# Patient Record
Sex: MALE | Race: WHITE | Employment: FULL TIME | URBAN - METROPOLITAN AREA
[De-identification: names, ages, dates, MRNs, and addresses within clinical notes are randomized per-mention and may not be internally consistent; named-entity substitution may affect disease eponyms.]

---

## 2017-02-16 DIAGNOSIS — Z94.0 KIDNEY TRANSPLANTED: ICD-10-CM

## 2017-02-16 RX ORDER — PREDNISONE 5 MG/1
5 TABLET ORAL DAILY
Qty: 30 TABLET | Refills: 11 | Status: SHIPPED | OUTPATIENT
Start: 2017-02-16 | End: 2018-08-02

## 2017-02-16 RX ORDER — AZATHIOPRINE 50 MG/1
100 TABLET ORAL DAILY
Qty: 60 TABLET | Refills: 11 | Status: SHIPPED | OUTPATIENT
Start: 2017-02-16 | End: 2018-06-15

## 2017-02-27 ENCOUNTER — OFFICE VISIT (OUTPATIENT)
Dept: NEPHROLOGY | Facility: CLINIC | Age: 67
End: 2017-02-27
Attending: INTERNAL MEDICINE
Payer: MEDICARE

## 2017-02-27 VITALS
HEART RATE: 66 BPM | HEIGHT: 65 IN | TEMPERATURE: 97.9 F | RESPIRATION RATE: 16 BRPM | SYSTOLIC BLOOD PRESSURE: 129 MMHG | WEIGHT: 170.4 LBS | BODY MASS INDEX: 28.39 KG/M2 | DIASTOLIC BLOOD PRESSURE: 77 MMHG

## 2017-02-27 DIAGNOSIS — Z94.0 KIDNEY REPLACED BY TRANSPLANT: Primary | ICD-10-CM

## 2017-02-27 DIAGNOSIS — D84.9 IMMUNOSUPPRESSION (H): ICD-10-CM

## 2017-02-27 DIAGNOSIS — Z94.0 KIDNEY TRANSPLANT RECIPIENT: Primary | ICD-10-CM

## 2017-02-27 DIAGNOSIS — Z79.60 LONG-TERM USE OF IMMUNOSUPPRESSANT MEDICATION: ICD-10-CM

## 2017-02-27 DIAGNOSIS — Z94.0 KIDNEY TRANSPLANT RECIPIENT: ICD-10-CM

## 2017-02-27 DIAGNOSIS — E55.9 VITAMIN D DEFICIENCY: ICD-10-CM

## 2017-02-27 DIAGNOSIS — E78.2 MIXED HYPERLIPIDEMIA: ICD-10-CM

## 2017-02-27 PROBLEM — Z48.298 AFTERCARE FOLLOWING ORGAN TRANSPLANT: Status: ACTIVE | Noted: 2017-02-27

## 2017-02-27 LAB
ALBUMIN SERPL-MCNC: 3.9 G/DL (ref 3.4–5)
ALP SERPL-CCNC: 72 U/L (ref 40–150)
ALT SERPL W P-5'-P-CCNC: 23 U/L (ref 0–70)
ANION GAP SERPL CALCULATED.3IONS-SCNC: 8 MMOL/L (ref 3–14)
AST SERPL W P-5'-P-CCNC: 11 U/L (ref 0–45)
BILIRUB DIRECT SERPL-MCNC: 0.1 MG/DL (ref 0–0.2)
BILIRUB SERPL-MCNC: 0.6 MG/DL (ref 0.2–1.3)
BUN SERPL-MCNC: 24 MG/DL (ref 7–30)
CALCIUM SERPL-MCNC: 8.6 MG/DL (ref 8.5–10.1)
CHLORIDE SERPL-SCNC: 107 MMOL/L (ref 94–109)
CO2 SERPL-SCNC: 26 MMOL/L (ref 20–32)
CREAT SERPL-MCNC: 0.79 MG/DL (ref 0.66–1.25)
CREAT UR-MCNC: 59 MG/DL
ERYTHROCYTE [DISTWIDTH] IN BLOOD BY AUTOMATED COUNT: 11.9 % (ref 10–15)
GFR SERPL CREATININE-BSD FRML MDRD: ABNORMAL ML/MIN/1.7M2
GLUCOSE SERPL-MCNC: 124 MG/DL (ref 70–99)
HCT VFR BLD AUTO: 39.8 % (ref 40–53)
HGB BLD-MCNC: 13.1 G/DL (ref 13.3–17.7)
MCH RBC QN AUTO: 32.4 PG (ref 26.5–33)
MCHC RBC AUTO-ENTMCNC: 32.9 G/DL (ref 31.5–36.5)
MCV RBC AUTO: 99 FL (ref 78–100)
PLATELET # BLD AUTO: 232 10E9/L (ref 150–450)
POTASSIUM SERPL-SCNC: 5 MMOL/L (ref 3.4–5.3)
PROT SERPL-MCNC: 7.3 G/DL (ref 6.8–8.8)
PROT UR-MCNC: 0.25 G/L
PROT/CREAT 24H UR: 0.43 G/G CR (ref 0–0.2)
RBC # BLD AUTO: 4.04 10E12/L (ref 4.4–5.9)
SODIUM SERPL-SCNC: 141 MMOL/L (ref 133–144)
WBC # BLD AUTO: 5.7 10E9/L (ref 4–11)

## 2017-02-27 PROCEDURE — 80048 BASIC METABOLIC PNL TOTAL CA: CPT | Performed by: INTERNAL MEDICINE

## 2017-02-27 PROCEDURE — 99213 OFFICE O/P EST LOW 20 MIN: CPT | Mod: ZF

## 2017-02-27 PROCEDURE — 36415 COLL VENOUS BLD VENIPUNCTURE: CPT | Performed by: INTERNAL MEDICINE

## 2017-02-27 PROCEDURE — 80076 HEPATIC FUNCTION PANEL: CPT | Performed by: INTERNAL MEDICINE

## 2017-02-27 PROCEDURE — 84156 ASSAY OF PROTEIN URINE: CPT | Performed by: INTERNAL MEDICINE

## 2017-02-27 PROCEDURE — 85027 COMPLETE CBC AUTOMATED: CPT | Performed by: INTERNAL MEDICINE

## 2017-02-27 ASSESSMENT — PAIN SCALES - GENERAL: PAINLEVEL: NO PAIN (0)

## 2017-02-27 NOTE — PROGRESS NOTES
ASSESSMENT AND PLAN:   1.  End-stage kidney disease, status post living donor kidney transplant 31 years ago with excellent stable allograft function.   2.  Immunosuppression management.  Currently maintained on dual agent with Imuran 100 mg daily and prednisone 5 mg.   3.  Dyslipidemia, well controlled on statin therapy.   4.  Hypertension, controlled.   5.  Renal osteodystrophy.  We will need to check a PTH and vitamin D at some point.   6.  Opportunistic infection prophylaxis.  Currently on Bactrim lifelong with good tolerance.      DISCUSSION:  Overall, Mr. Yfn Cavazos appears to be doing fairly well.  He had no specific complaints today, and his allograft function is steady.  In regard to his immunosuppression, he is tolerating his regimen fairly well without obvious complication.  He follows up with Dermatology regularly, and he is scheduled to see them tomorrow.  I made no changes to his immunosuppression.  We reviewed the labs and discussed routine followup.  With his next lab draw, we will ask the coordinator to add vitamin D and PTH checks, and he will return to clinic in 1 year.      HISTORY OF PRESENT ILLNESS:  Mr. Yfn Cavazos is a 66-year-old gentleman with end-stage kidney disease.  He was on dialysis for a year and half prior to receiving a living donor kidney transplant on 08/06/1985.  Post-engraftment, he had no obvious complications.  He was tapered off CNI about 5-7 years ago slowly, and he tolerated this taper fairly well.  He does not have significant proteinuria.  He is currently maintained on Imuran and prednisone 5 mg daily.  He comes in for routine followup.  He has no specific complaints or concerns.  He follows up regularly with his primary care provider.  He was recently checked for hepatitis C in response to a TV ad, and was negative.      REVIEW OF SYSTEMS:  Comprehensive review of systems was negative, except as noted above.            Transplant Hx:       Tx: LDKT  Date: 8/6/1985       " Present Maintenance IS: Azathioprine and Prednisone       Baseline Creatinine: 0.8-0.9 mg/dL       Recent DSA: No         Biopsy: No    Home BP: well controlled.      ROS:   A comprehensive review of systems was obtained and negative, except as noted in the HPI or PMH.    Active Medical Problems:  Patient Active Problem List   Diagnosis     Kidney replaced by transplant       Personal Hx:  Social History     Social History     Marital status:      Spouse name: N/A     Number of children: N/A     Years of education: N/A     Occupational History     Not on file.     Social History Main Topics     Smoking status: Never Smoker     Smokeless tobacco: Never Used     Alcohol use Not on file     Drug use: Not on file     Sexual activity: Not on file     Other Topics Concern     Not on file     Social History Narrative       Allergies:  Allergies   Allergen Reactions     Contrast Dye Rash       Medications:  Prior to Admission medications    Medication Sig Start Date End Date Taking? Authorizing Provider   predniSONE (DELTASONE) 5 MG tablet Take 1 tablet (5 mg) by mouth daily 2/16/17  Yes Scout Baumann MD   azaTHIOprine (IMURAN) 50 MG tablet Take 2 tablets (100 mg) by mouth daily 2/16/17  Yes Scout Baumann MD   sulfamethoxazole-trimethoprim (BACTRIM/SEPTRA) 400-80 MG per tablet Take 1 tablet by mouth daily 12/22/16  Yes Johana Retana MD   pravastatin (PRAVACHOL) 40 MG tablet Take 1 tablet (40 mg) by mouth daily 11/19/13  Yes Johana Retana MD       Vitals:  /77  Pulse 66  Temp 97.9  F (36.6  C) (Oral)  Resp 16  Ht 1.657 m (5' 5.25\")  Wt 77.3 kg (170 lb 6.4 oz)  BMI 28.14 kg/m2    Exam:   GENERAL APPEARANCE: alert and no distress  HENT: mouth without ulcers or lesions  LYMPHATICS: no cervical or supraclavicular nodes  RESP: lungs clear to auscultation - no rales, rhonchi or wheezes  CV: regular rhythm, normal rate, no rub, no murmur  EDEMA: no LE edema bilaterally  ABDOMEN: soft, nondistended, " nontender, bowel sounds normal  MS: extremities normal - no gross deformities noted, no evidence of inflammation in joints, no muscle tenderness  SKIN: no rash    Results:   Reviewed

## 2017-02-27 NOTE — LETTER
Date:March 3, 2017      Patient was self referred, no letter generated. Do not send.        [facility] Health Information

## 2017-02-27 NOTE — NURSING NOTE
"Chief Complaint   Patient presents with     RECHECK     Kidney follow up       Initial /77  Pulse 66  Temp 97.9  F (36.6  C) (Oral)  Resp 16  Ht 1.657 m (5' 5.25\")  Wt 77.3 kg (170 lb 6.4 oz)  BMI 28.14 kg/m2 Estimated body mass index is 28.14 kg/(m^2) as calculated from the following:    Height as of this encounter: 1.657 m (5' 5.25\").    Weight as of this encounter: 77.3 kg (170 lb 6.4 oz).  Medication Reconciliation: complete    "

## 2017-02-27 NOTE — MR AVS SNAPSHOT
"              After Visit Summary   2/27/2017    Yfn Cavazos    MRN: [id number]           Patient Information     Date Of Birth          1950        Visit Information        Provider Department      2/27/2017 2:00 PM Scout Baumann MD [facility] Nephrology        Today's Diagnoses     Kidney replaced by transplant    -  1    Immunosuppression (H)        Mixed hyperlipidemia           Follow-ups after your visit        Who to contact     If you have questions or need follow up information about today's clinic visit or your schedule please contact [facility] NEPHROLOGY directly at [phone].  Normal or non-critical lab and imaging results will be communicated to you by Scand[employer]art, letter or phone within 4 business days after the clinic has received the results. If you do not hear from us within 7 days, please contact the clinic through [employer] or phone. If you have a critical or abnormal lab result, we will notify you by phone as soon as possible.  Submit refill requests through [employer] or call your pharmacy and they will forward the refill request to us. Please allow 3 business days for your refill to be completed.          Additional Information About Your Visit        MyChart Information     [employer] gives you secure access to your electronic health record. If you see a primary care provider, you can also send messages to your care team and make appointments. If you have questions, please call your primary care clinic.  If you do not have a primary care provider, please call [phone] and they will assist you.        Care EveryWhere ID     This is your Care EveryWhere ID. This could be used by other organizations to access your [place] medical records  [id number]        Your Vitals Were     Pulse Temperature Respirations Height BMI (Body Mass Index)       66 97.9  F (36.6  C) (Oral) 16 1.657 m (5' 5.25\") 28.14 kg/m2        Blood Pressure from Last 3 Encounters:   02/27/17 129/77   04/27/15 120/73 "   04/21/14 116/66    Weight from Last 3 Encounters:   02/27/17 77.3 kg (170 lb 6.4 oz)   04/27/15 77.3 kg (170 lb 6.4 oz)   04/21/14 77.5 kg (170 lb 14.4 oz)              Today, you had the following     No orders found for display       Primary Care Provider    Clinic Do Not Use [facility]       DU[place]  [place] [address]        Thank you!     Thank you for choosing [facility] NEPHROLOGY  for your care. Our goal is always to provide you with excellent care. Hearing back from our patients is one way we can continue to improve our services. Please take a few minutes to complete the written survey that you may receive in the mail after your visit with us. Thank you!             Your Updated Medication List - Protect others around you: Learn how to safely use, store and throw away your medicines at www.disposemymeds.org.          This list is accurate as of: 2/27/17 11:59 PM.  Always use your most recent med list.                   Brand Name Dispense Instructions for use    azaTHIOprine 50 MG tablet    IMURAN    60 tablet    Take 2 tablets (100 mg) by mouth daily       pravastatin 40 MG tablet    PRAVACHOL    90 tablet    Take 1 tablet (40 mg) by mouth daily       predniSONE 5 MG tablet    DELTASONE    30 tablet    Take 1 tablet (5 mg) by mouth daily       sulfamethoxazole-trimethoprim 400-80 MG per tablet    BACTRIM/SEPTRA    30 tablet    Take 1 tablet by mouth daily

## 2017-02-27 NOTE — NURSING NOTE
Received message from Dr. Baumann: pth and vitamin d         Once a year needs to be checked   Please add to next labs     Lab orders entered.    Marisol Bryan RN

## 2017-02-27 NOTE — Clinical Note
2/27/2017       RE: Yfn Cavazos  [id number] [address]     Dear Colleague,    Thank you for referring your patient, Yfn Cavazos, to the [facility] NEPHROLOGY at [facility]. Please see a copy of my visit note below.    ASSESSMENT AND PLAN:   1.  End-stage kidney disease, status post living donor kidney transplant 31 years ago with excellent stable allograft function.   2.  Immunosuppression management.  Currently maintained on dual agent with Imuran 100 mg daily and prednisone 5 mg.   3.  Dyslipidemia, well controlled on statin therapy.   4.  Hypertension, controlled.   5.  Renal osteodystrophy.  We will need to check a PTH and vitamin D at some point.   6.  Opportunistic infection prophylaxis.  Currently on Bactrim lifelong with good tolerance.      DISCUSSION:  Overall, Mr. Yfn Cavazos appears to be doing fairly well.  He had no specific complaints today, and his allograft function is steady.  In regard to his immunosuppression, he is tolerating his regimen fairly well without obvious complication.  He follows up with Dermatology regularly, and he is scheduled to see them tomorrow.  I made no changes to his immunosuppression.  We reviewed the labs and discussed routine followup.  With his next lab draw, we will ask the coordinator to add vitamin D and PTH checks, and he will return to clinic in 1 year.      HISTORY OF PRESENT ILLNESS:  Mr. Yfn Cavazos is a 66-year-old gentleman with end-stage kidney disease.  He was on dialysis for a year and half prior to receiving a living donor kidney transplant on 08/06/1985.  Post-engraftment, he had no obvious complications.  He was tapered off CNI about 5-7 years ago slowly, and he tolerated this taper fairly well.  He does not have significant proteinuria.  He is currently maintained on Imuran and prednisone 5 mg daily.  He comes in for routine followup.  He has no specific complaints or concerns.  He follows up  "regularly with his primary care provider.  He was recently checked for hepatitis C in response to a TV ad, and was negative.      REVIEW OF SYSTEMS:  Comprehensive review of systems was negative, except as noted above.            Transplant Hx:       Tx: LDKT  Date: 8/6/1985       Present Maintenance IS: Azathioprine and Prednisone       Baseline Creatinine: 0.8-0.9 mg/dL       Recent DSA: No         Biopsy: No    Home BP: well controlled.      ROS:   A comprehensive review of systems was obtained and negative, except as noted in the HPI or PMH.    Active Medical Problems:  Patient Active Problem List   Diagnosis     Kidney replaced by transplant       Personal Hx:  Social History     Social History     Marital status:      Spouse name: N/A     Number of children: N/A     Years of education: N/A     Occupational History     Not on file.     Social History Main Topics     Smoking status: Never Smoker     Smokeless tobacco: Never Used     Alcohol use Not on file     Drug use: Not on file     Sexual activity: Not on file     Other Topics Concern     Not on file     Social History Narrative       Allergies:  Allergies   Allergen Reactions     Contrast Dye Rash       Medications:  Prior to Admission medications    Medication Sig Start Date End Date Taking? Authorizing Provider   predniSONE (DELTASONE) 5 MG tablet Take 1 tablet (5 mg) by mouth daily 2/16/17  Yes Scout Baumann MD   azaTHIOprine (IMURAN) 50 MG tablet Take 2 tablets (100 mg) by mouth daily 2/16/17  Yes Scout Baumann MD   sulfamethoxazole-trimethoprim (BACTRIM/SEPTRA) 400-80 MG per tablet Take 1 tablet by mouth daily 12/22/16  Yes Johana Retana MD   pravastatin (PRAVACHOL) 40 MG tablet Take 1 tablet (40 mg) by mouth daily 11/19/13  Yes Johana Retana MD       Vitals:  /77  Pulse 66  Temp 97.9  F (36.6  C) (Oral)  Resp 16  Ht 1.657 m (5' 5.25\")  Wt 77.3 kg (170 lb 6.4 oz)  BMI 28.14 kg/m2    Exam:   GENERAL APPEARANCE: alert and no " distress  HENT: mouth without ulcers or lesions  LYMPHATICS: no cervical or supraclavicular nodes  RESP: lungs clear to auscultation - no rales, rhonchi or wheezes  CV: regular rhythm, normal rate, no rub, no murmur  EDEMA: no LE edema bilaterally  ABDOMEN: soft, nondistended, nontender, bowel sounds normal  MS: extremities normal - no gross deformities noted, no evidence of inflammation in joints, no muscle tenderness  SKIN: no rash    Results:   Reviewed       Again, thank you for allowing me to participate in the care of your patient.      Sincerely,    Scout Baumann MD

## 2017-03-31 DIAGNOSIS — Z94.0 LIVING-DONOR KIDNEY TRANSPLANT RECIPIENT: ICD-10-CM

## 2017-03-31 DIAGNOSIS — Z94.0 KIDNEY REPLACED BY TRANSPLANT: Primary | ICD-10-CM

## 2017-04-03 RX ORDER — PREDNISONE 5 MG/1
5 TABLET ORAL DAILY
Qty: 90 TABLET | Refills: 3 | Status: SHIPPED | OUTPATIENT
Start: 2017-04-03 | End: 2018-06-03

## 2017-04-03 RX ORDER — SULFAMETHOXAZOLE AND TRIMETHOPRIM 400; 80 MG/1; MG/1
1 TABLET ORAL DAILY
Qty: 90 TABLET | Refills: 3 | Status: SHIPPED | OUTPATIENT
Start: 2017-04-03 | End: 2018-06-03

## 2017-06-15 ENCOUNTER — TELEPHONE (OUTPATIENT)
Dept: TRANSPLANT | Facility: CLINIC | Age: 67
End: 2017-06-15

## 2017-06-15 NOTE — TELEPHONE ENCOUNTER
Please fax updated lab order asap 06/15/17 had labs drawn this am.,  F# [phone] attn: Magali  The fax Yfn gave me is to the [facility] lab # [phone]

## 2017-06-15 NOTE — LETTER
OUTPATIENT LABORATORY TEST ORDER    Patient Name: Yfn Cavazos  Transplant Date: August 6, 1985   YOB: 1950                                                                    Issue Date & Time: 6/15/2017  11:32 AM  [facility] MR: [id number]  Exp. Date (1 year after date issued)      Diagnoses: After care following organ transplant (ICD-10  Z48.288)   Kidney Transplant (ICD-10  Z94.0)   Long term use of medications (ICD-10  Z79.899)     Lab results to be available on the same day drawn.   Patient should release information to the [facility], Transplant Center.  Please fax to the Transplant Center at [phone].      Every 6 Month(s):  ?Hemogram and Platelet  ?Basic Metabolic Panel (Sodium, Potassium, Chloride, CO2, Creatinine, Urea Nitrogen, Glucose, Calcium)                     Every 12 Months                       ?Random Urine for protein/creatinine ratio      If you have any questions, please call The Transplant Center at [phone] or [phone].      Please fax labs to [phone]        Clark Lowery MD      Surgical Director, Kidney Transplantation

## 2018-06-03 DIAGNOSIS — Z94.0 KIDNEY REPLACED BY TRANSPLANT: Primary | ICD-10-CM

## 2018-06-03 DIAGNOSIS — Z94.0 LIVING-DONOR KIDNEY TRANSPLANT RECIPIENT: ICD-10-CM

## 2018-06-04 RX ORDER — SULFAMETHOXAZOLE AND TRIMETHOPRIM 400; 80 MG/1; MG/1
1 TABLET ORAL DAILY
Qty: 90 TABLET | Refills: 3 | Status: SHIPPED | OUTPATIENT
Start: 2018-06-04 | End: 2019-12-20

## 2018-06-04 RX ORDER — PREDNISONE 5 MG/1
5 TABLET ORAL DAILY
Qty: 90 TABLET | Refills: 3 | Status: SHIPPED | OUTPATIENT
Start: 2018-06-04 | End: 2019-02-20

## 2018-06-15 DIAGNOSIS — Z94.0 KIDNEY TRANSPLANTED: ICD-10-CM

## 2018-06-15 RX ORDER — AZATHIOPRINE 50 MG/1
100 TABLET ORAL DAILY
Qty: 60 TABLET | Refills: 11 | Status: SHIPPED | OUTPATIENT
Start: 2018-06-15 | End: 2018-06-18

## 2018-06-18 ENCOUNTER — TELEPHONE (OUTPATIENT)
Dept: TRANSPLANT | Facility: CLINIC | Age: 68
End: 2018-06-18

## 2018-06-18 DIAGNOSIS — Z94.0 KIDNEY TRANSPLANTED: Primary | ICD-10-CM

## 2018-06-18 RX ORDER — AZATHIOPRINE 50 MG/1
100 TABLET ORAL DAILY
Qty: 60 TABLET | Refills: 1 | Status: SHIPPED | OUTPATIENT
Start: 2018-06-18 | End: 2018-06-18

## 2018-06-18 RX ORDER — AZATHIOPRINE 50 MG/1
100 TABLET ORAL DAILY
Qty: 60 TABLET | Refills: 11 | Status: SHIPPED | OUTPATIENT
Start: 2018-06-18 | End: 2019-02-19

## 2018-06-18 NOTE — TELEPHONE ENCOUNTER
Returned pt phone call regarding imuran. RNCC let pt know medication has been reordered.  Pt questions answered, pt v/u.

## 2018-06-18 NOTE — TELEPHONE ENCOUNTER
Patient Call: Medication Refill  Route to LPN  Instruct the patient to first contact their pharmacy. If they have called their pharmacy and require further assistance, route to LPN.    Pharmacy Name: [facility]  Pharmacy Location: [place]  Name of Medication: Imuran Dose: 50 mg tablets  When will the patient be out of this medication?: Less than 24 hours [facility] then page if no answer)     Pts last dose was yesterday 6/17/18 @ 8pm. Please fax Rx refill & notify pt when Rx has been sent

## 2018-07-16 ENCOUNTER — TELEPHONE (OUTPATIENT)
Dept: NEPHROLOGY | Facility: CLINIC | Age: 68
End: 2018-07-16

## 2018-07-16 NOTE — TELEPHONE ENCOUNTER
Spoke with patient for transplant follow up. States he's doing very well, denied any questions or concerns ahead of appointment.    Marisol Bryan RN

## 2018-08-02 ENCOUNTER — OFFICE VISIT (OUTPATIENT)
Dept: NEPHROLOGY | Facility: CLINIC | Age: 68
End: 2018-08-02
Attending: INTERNAL MEDICINE
Payer: MEDICARE

## 2018-08-02 VITALS
HEART RATE: 59 BPM | BODY MASS INDEX: 27.31 KG/M2 | DIASTOLIC BLOOD PRESSURE: 80 MMHG | SYSTOLIC BLOOD PRESSURE: 136 MMHG | WEIGHT: 165.4 LBS | TEMPERATURE: 97.8 F | OXYGEN SATURATION: 95 %

## 2018-08-02 DIAGNOSIS — Z48.298 AFTERCARE FOLLOWING ORGAN TRANSPLANT: ICD-10-CM

## 2018-08-02 DIAGNOSIS — Z94.0 KIDNEY REPLACED BY TRANSPLANT: ICD-10-CM

## 2018-08-02 DIAGNOSIS — R73.01 IMPAIRED FASTING GLUCOSE: ICD-10-CM

## 2018-08-02 DIAGNOSIS — Z48.298 AFTERCARE FOLLOWING ORGAN TRANSPLANT: Primary | ICD-10-CM

## 2018-08-02 DIAGNOSIS — E78.2 MIXED HYPERLIPIDEMIA: ICD-10-CM

## 2018-08-02 LAB
ALBUMIN SERPL-MCNC: 3.8 G/DL (ref 3.4–5)
ANION GAP SERPL CALCULATED.3IONS-SCNC: 8 MMOL/L (ref 3–14)
BASOPHILS # BLD AUTO: 0 10E9/L (ref 0–0.2)
BASOPHILS NFR BLD AUTO: 0.3 %
BUN SERPL-MCNC: 20 MG/DL (ref 7–30)
CALCIUM SERPL-MCNC: 8.5 MG/DL (ref 8.5–10.1)
CHLORIDE SERPL-SCNC: 106 MMOL/L (ref 94–109)
CHOLEST SERPL-MCNC: 157 MG/DL
CO2 SERPL-SCNC: 24 MMOL/L (ref 20–32)
CREAT SERPL-MCNC: 0.82 MG/DL (ref 0.66–1.25)
DIFFERENTIAL METHOD BLD: ABNORMAL
EOSINOPHIL # BLD AUTO: 0 10E9/L (ref 0–0.7)
EOSINOPHIL NFR BLD AUTO: 0.3 %
ERYTHROCYTE [DISTWIDTH] IN BLOOD BY AUTOMATED COUNT: 12.4 % (ref 10–15)
GFR SERPL CREATININE-BSD FRML MDRD: >90 ML/MIN/1.7M2
GLUCOSE SERPL-MCNC: 101 MG/DL (ref 70–99)
HBA1C MFR BLD: 5.6 % (ref 0–5.6)
HCT VFR BLD AUTO: 38.4 % (ref 40–53)
HDLC SERPL-MCNC: 52 MG/DL
HGB BLD-MCNC: 12.7 G/DL (ref 13.3–17.7)
IMM GRANULOCYTES # BLD: 0.1 10E9/L (ref 0–0.4)
IMM GRANULOCYTES NFR BLD: 0.7 %
LDLC SERPL CALC-MCNC: 91 MG/DL
LYMPHOCYTES # BLD AUTO: 1.3 10E9/L (ref 0.8–5.3)
LYMPHOCYTES NFR BLD AUTO: 17.7 %
MCH RBC QN AUTO: 33.1 PG (ref 26.5–33)
MCHC RBC AUTO-ENTMCNC: 33.1 G/DL (ref 31.5–36.5)
MCV RBC AUTO: 100 FL (ref 78–100)
MONOCYTES # BLD AUTO: 0.5 10E9/L (ref 0–1.3)
MONOCYTES NFR BLD AUTO: 6.9 %
NEUTROPHILS # BLD AUTO: 5.4 10E9/L (ref 1.6–8.3)
NEUTROPHILS NFR BLD AUTO: 74.1 %
NONHDLC SERPL-MCNC: 104 MG/DL
NRBC # BLD AUTO: 0 10*3/UL
NRBC BLD AUTO-RTO: 0 /100
PHOSPHATE SERPL-MCNC: 3.2 MG/DL (ref 2.5–4.5)
PLATELET # BLD AUTO: 242 10E9/L (ref 150–450)
POTASSIUM SERPL-SCNC: 4.3 MMOL/L (ref 3.4–5.3)
RBC # BLD AUTO: 3.84 10E12/L (ref 4.4–5.9)
SODIUM SERPL-SCNC: 138 MMOL/L (ref 133–144)
TRIGL SERPL-MCNC: 69 MG/DL
URATE SERPL-MCNC: 4.4 MG/DL (ref 3.5–7.2)
WBC # BLD AUTO: 7.3 10E9/L (ref 4–11)

## 2018-08-02 PROCEDURE — 85025 COMPLETE CBC W/AUTO DIFF WBC: CPT

## 2018-08-02 PROCEDURE — G0463 HOSPITAL OUTPT CLINIC VISIT: HCPCS | Mod: ZF

## 2018-08-02 PROCEDURE — 84550 ASSAY OF BLOOD/URIC ACID: CPT

## 2018-08-02 PROCEDURE — 80069 RENAL FUNCTION PANEL: CPT

## 2018-08-02 PROCEDURE — 80061 LIPID PANEL: CPT

## 2018-08-02 PROCEDURE — 83036 HEMOGLOBIN GLYCOSYLATED A1C: CPT

## 2018-08-02 ASSESSMENT — PAIN SCALES - GENERAL: PAINLEVEL: NO PAIN (0)

## 2018-08-02 NOTE — NURSING NOTE
Chief Complaint   Patient presents with     RECHECK     Follow up Kidney tx       /80 (BP Location: Left arm)  Pulse 59  Temp 97.8  F (36.6  C) (Oral)  Wt 75 kg (165 lb 6.4 oz)  SpO2 95%  BMI 27.31 kg/m2   Eloise Ross

## 2018-08-02 NOTE — MR AVS SNAPSHOT
After Visit Summary   8/2/2018    Yfn Cavazos    MRN: [id number]           Patient Information     Date Of Birth          1950        Visit Information        Provider Department      8/2/2018 1:55 PM Recipient, [facility] Nephrology        Today's Diagnoses     Aftercare following organ transplant    -  1    Kidney replaced by transplant        Mixed hyperlipidemia        Impaired fasting glucose          Care Instructions    Ok to start B3 500 mg once or twice daily           Follow-ups after your visit        Follow-up notes from your care team     Return in about 1 year (around 8/2/2019).      Who to contact     If you have questions or need follow up information about today's clinic visit or your schedule please contact [facility] NEPHROLOGY directly at [phone].  Normal or non-critical lab and imaging results will be communicated to you by Weele[employer]art, letter or phone within 4 business days after the clinic has received the results. If you do not hear from us within 7 days, please contact the clinic through [employer] or phone. If you have a critical or abnormal lab result, we will notify you by phone as soon as possible.  Submit refill requests through [employer] or call your pharmacy and they will forward the refill request to us. Please allow 3 business days for your refill to be completed.          Additional Information About Your Visit        MyChart Information     [employer] gives you secure access to your electronic health record. If you see a primary care provider, you can also send messages to your care team and make appointments. If you have questions, please call your primary care clinic.  If you do not have a primary care provider, please call [phone] and they will assist you.        Care EveryWhere ID     This is your Care EveryWhere ID. This could be used by other organizations to access your [place] medical records  [id number]        Your Vitals Were      Pulse Temperature Pulse Oximetry BMI (Body Mass Index)          59 97.8  F (36.6  C) (Oral) 95% 27.31 kg/m2         Blood Pressure from Last 3 Encounters:   08/02/18 136/80   02/27/17 129/77   04/27/15 120/73    Weight from Last 3 Encounters:   08/02/18 75 kg (165 lb 6.4 oz)   02/27/17 77.3 kg (170 lb 6.4 oz)   04/27/15 77.3 kg (170 lb 6.4 oz)                 Today's Medication Changes          These changes are accurate as of 8/2/18 11:59 PM.  If you have any questions, ask your nurse or doctor.               These medicines have changed or have updated prescriptions.        Dose/Directions    predniSONE 5 MG tablet   Commonly known as:  DELTASONE   This may have changed:  Another medication with the same name was removed. Continue taking this medication, and follow the directions you see here.   Used for:  Living-donor kidney transplant recipient, Kidney replaced by transplant   Changed by:  Recipient, [facility]        Dose:  5 mg   Take 1 tablet (5 mg) by mouth daily   Quantity:  90 tablet   Refills:  3       sulfamethoxazole-trimethoprim 400-80 MG per tablet   Commonly known as:  BACTRIM/SEPTRA   This may have changed:  Another medication with the same name was removed. Continue taking this medication, and follow the directions you see here.   Used for:  Kidney replaced by transplant, Living-donor kidney transplant recipient   Changed by:  Recipient, [facility]Pancreas        Dose:  1 tablet   Take 1 tablet by mouth daily   Quantity:  90 tablet   Refills:  3                Primary Care Provider    Clinic Do Not Use [facility] [address]        Equal Access to Services     [facility]: Hadii bere mccarthyo Socornelius, waaxda luqadaha, qaybta kaalmada adeegyada, evie mccain. So [facility] [phone].    ATENCIÓN: Si habla español, tiene a wiley disposición servicios gratuitos de asistencia lingüística. Llame al [phone].    We comply with applicable federal  civil rights laws and [place] laws. We do not discriminate on the basis of race, color, national origin, age, disability, sex, sexual orientation, or gender identity.            Thank you!     Thank you for choosing [facility] NEPHROLOGY  for your care. Our goal is always to provide you with excellent care. Hearing back from our patients is one way we can continue to improve our services. Please take a few minutes to complete the written survey that you may receive in the mail after your visit with us. Thank you!             Your Updated Medication List - Protect others around you: Learn how to safely use, store and throw away your medicines at www.disposemymeds.org.          This list is accurate as of 8/2/18 11:59 PM.  Always use your most recent med list.                   Brand Name Dispense Instructions for use Diagnosis    azaTHIOprine 50 MG tablet    IMURAN    60 tablet    Take 2 tablets (100 mg) by mouth daily    Kidney transplanted       pravastatin 40 MG tablet    PRAVACHOL    90 tablet    Take 1 tablet (40 mg) by mouth daily    Hypercholesteremia       predniSONE 5 MG tablet    DELTASONE    90 tablet    Take 1 tablet (5 mg) by mouth daily    Living-donor kidney transplant recipient, Kidney replaced by transplant       sulfamethoxazole-trimethoprim 400-80 MG per tablet    BACTRIM/SEPTRA    90 tablet    Take 1 tablet by mouth daily    Kidney replaced by transplant, Living-donor kidney transplant recipient

## 2018-08-02 NOTE — PROGRESS NOTES
ASSESSMENT AND PLAN:   1.  End-stage kidney disease, status post living donor kidney transplant 33 years ago with excellent stable allograft function.   2.  Immunosuppression management.  Currently maintained on dual agent with Imuran 100 mg daily and prednisone 5 mg.   3.  Dyslipidemia, well controlled on statin therapy.   4.  Hypertension, controlled.   5.  Renal osteodystrophy.  We will need to check a PTH and vitamin D at some point.   6.  Opportunistic infection prophylaxis.  Currently on Bactrim lifelong with good tolerance.   7. Skin cancer: we discussed switching to MMF although he is leery of this change   8. Ok to proceed with Shingrix vaccine if desired      HISTORY OF PRESENT ILLNESS:  Mr. Yfn Cavazos is a 68-year-old gentleman with end-stage kidney disease.  He was on dialysis for a year and half prior to receiving a living donor kidney transplant on 08/06/1985.  Post-engraftment, he had no obvious complications.  He was tapered off CNI over 7 years ago slowly, and he tolerated this taper fairly well.  He does not have significant proteinuria.  He is currently maintained on Imuran and prednisone 5 mg daily.  He comes in for routine followup.  He has no specific complaints or concerns.  He follows up regularly with his primary care provider. Continue to deal with skin cancer we discussed MMF switch but would like to hold off.          Transplant Hx:       Tx: LDKT  Date: 8/6/1985       Present Maintenance IS: Azathioprine and Prednisone       Baseline Creatinine: 0.8-0.9 mg/dL       Recent DSA: No         Biopsy: No    Home BP: well controlled.      ROS:   A comprehensive review of systems was obtained and negative, except as noted in the HPI or PMH.    Active Medical Problems:  Patient Active Problem List   Diagnosis     Kidney replaced by transplant     Immunosuppression (H)     Mixed hyperlipidemia     Aftercare following organ transplant       Personal Hx:  Social History     Social History      Marital status:      Spouse name: N/A     Number of children: N/A     Years of education: N/A     Occupational History     Not on file.     Social History Main Topics     Smoking status: Never Smoker     Smokeless tobacco: Never Used     Alcohol use Not on file     Drug use: Not on file     Sexual activity: Not on file     Other Topics Concern     Not on file     Social History Narrative       Allergies:  Allergies   Allergen Reactions     Contrast Dye Rash       Medications:  Prior to Admission medications    Medication Sig Start Date End Date Taking? Authorizing Provider   predniSONE (DELTASONE) 5 MG tablet Take 1 tablet (5 mg) by mouth daily 2/16/17  Yes Scout Baumann MD   azaTHIOprine (IMURAN) 50 MG tablet Take 2 tablets (100 mg) by mouth daily 2/16/17  Yes Scout Baumann MD   sulfamethoxazole-trimethoprim (BACTRIM/SEPTRA) 400-80 MG per tablet Take 1 tablet by mouth daily 12/22/16  Yes Johana Retana MD   pravastatin (PRAVACHOL) 40 MG tablet Take 1 tablet (40 mg) by mouth daily 11/19/13  Yes Johana Retana MD       Vitals:  /80 (BP Location: Left arm)  Pulse 59  Temp 97.8  F (36.6  C) (Oral)  Wt 75 kg (165 lb 6.4 oz)  SpO2 95%  BMI 27.31 kg/m2    Exam:   GENERAL APPEARANCE: alert and no distress  HENT: mouth without ulcers or lesions  LYMPHATICS: no cervical or supraclavicular nodes  RESP: lungs clear to auscultation - no rales, rhonchi or wheezes  CV: regular rhythm, normal rate, no rub, no murmur  EDEMA: no LE edema bilaterally  ABDOMEN: soft, nondistended, nontender, bowel sounds normal  MS: extremities normal - no gross deformities noted, no evidence of inflammation in joints, no muscle tenderness  SKIN: no rash    Results:   Reviewed

## 2018-11-20 ENCOUNTER — TELEPHONE (OUTPATIENT)
Dept: TRANSPLANT | Facility: CLINIC | Age: 68
End: 2018-11-20

## 2018-11-20 NOTE — LETTER
[facility]  Room 2-200  [facility],  [address]  [address]  Tel [phone]  Toll Free [phone]                OUTPATIENT LABORATORY TEST ORDER    Patient Name: Yfn Cavazos  Transplant Date: 8/6/1985 (Kidney)  YOB: 1950  Issue Date & Time:November 20, 20189:11 AM    [facility] MR:  [id number]  Exp. Date (1 year after date issued)      Diagnoses: Kidney Transplant (ICD-9  V42.0)   Long term use of medications (ICD-9  V58.69)     Lab results to be available on the same day drawn.   Patient should release information to the [facility], [facility].  Please fax to the Transplant Center at [phone].    Every 3 Months   ?Hemogram and Platelet   ?Basic Metabolic Panel (Sodium, Potassium, Chloride, CO2, Creatinine, BUN, Glucose, Calcium)                  Every 6 Months                                         ?Urine for protein/creatinine      If you have any questions, please call [facility] at [phone] or [phone].    Please fax labs to [phone]    Scout Baumann

## 2018-11-20 NOTE — TELEPHONE ENCOUNTER
Provider Call: Transplant Lab/Orders  Route to LPN  Post Transplant Days: [address]4422  When patient is less than 60 days post-transplant, route high priority  Reason for Call: Annual lab reorder, Patient was drawn this am  Liver patients reporting abnormal lab results: Route to RN and Page  Document lab facility information when provider is calling about annual lab orders. Delete facility wildcards when not needed.  Facility Name: [facility] Location: [place], [place]  Outside Facility Fax Number: [phone]  Callback needed? Yes    Return Call Needed  Same as documented in contacts section  When to return call?: Same day: Route High Priority

## 2018-11-26 ENCOUNTER — TELEPHONE (OUTPATIENT)
Dept: TRANSPLANT | Facility: CLINIC | Age: 68
End: 2018-11-26

## 2018-11-26 NOTE — LETTER
November 26, 2018    To whom it may concern,  Mr Cavazos received a kidney transplant 8/6/1985.  His kidney continues to have good function and pt is not requiring dialysis at this time.  Pt continues to follow up with

## 2018-11-26 NOTE — LETTER
PHYSICIAN ORDERS      DATE & TIME ISSUED: 2018 1:02 PM  PATIENT NAME: Yfn Cavazos   : 1950     [facility] MR# [if applicable]: [id number]     DIAGNOSIS:  Kidney Transplant  ICD-10 CODE: z94.0     To whom it may concern,    Mr Cavazos received a kidney transplant 1985.  His kidney continues to have good function and pt is not requiring dialysis at this time.  Pt continues to have transplant labs quarterly follow up with our transplant nephrology team annually.      Feel free to let us know if you have any further questions or concerns.  [phone]        Scout Baumann

## 2018-11-26 NOTE — TELEPHONE ENCOUNTER
Patient Call: General    Reason for call: Patient needs letter to give to insurance stating he has had a successful transplant and is not in renal failure. Patient request that the letter be mailed to him at his home address on file.    Call back needed? No

## 2019-02-18 ENCOUNTER — TELEPHONE (OUTPATIENT)
Dept: NEPHROLOGY | Facility: CLINIC | Age: 69
End: 2019-02-18

## 2019-02-18 DIAGNOSIS — Z94.0 KIDNEY TRANSPLANTED: ICD-10-CM

## 2019-02-18 NOTE — TELEPHONE ENCOUNTER
[facility] Call Center    Phone Message    May a detailed message be left on voicemail: yes    Reason for Call: Other: Adele from [facility] would like a verbal on azaTHIOprine (IMURAN) 50 MG tablet and for the prednisone. she will also fax information over.      Action Taken: Message routed to:  [facility] Neph

## 2019-02-19 ENCOUNTER — TELEPHONE (OUTPATIENT)
Dept: TRANSPLANT | Facility: CLINIC | Age: 69
End: 2019-02-19

## 2019-02-19 RX ORDER — AZATHIOPRINE 50 MG/1
100 TABLET ORAL DAILY
Qty: 60 TABLET | Refills: 11 | Status: SHIPPED | OUTPATIENT
Start: 2019-02-19 | End: 2019-07-09

## 2019-02-19 NOTE — TELEPHONE ENCOUNTER
Provider Call: Medication Refill  Route to LPN  Pharmacy Name: [facility]  Pharmacy Location: [place]  Name of Medication: AZA and Prednisone  With New Insurance Maritza GANDHI- Per Pharmacist they only need a phone call- [phone]  When will the patient be out of this medication?: Greater than 3 days (Route standard priority)  Callback needed? Yes when done- [phone] opt [phone]    Return Call Needed  Same as documented in contacts section  When to return call?: Greater than one day: Route standard priority

## 2019-02-19 NOTE — TELEPHONE ENCOUNTER
Prior Authorization Specialty Medication Request    Medication/Dose:     Azathioprine 50 mg tablet  Prednisone 5 mg tablet    ICD code (if different than what is on RX):  Z94.0 Kidney transplant   Previously Tried and Failed:      Cover My Med     Azathioprine Key: [id number]  Prednisone Key: [id number]    Pharmacy Information (if different than what is on RX)  Name:  [facility]  Phone:  [phone]       B santino D

## 2019-02-20 DIAGNOSIS — Z94.0 KIDNEY REPLACED BY TRANSPLANT: Primary | ICD-10-CM

## 2019-02-20 DIAGNOSIS — Z94.0 LIVING-DONOR KIDNEY TRANSPLANT RECIPIENT: ICD-10-CM

## 2019-02-20 RX ORDER — PREDNISONE 5 MG/1
5 TABLET ORAL DAILY
Qty: 90 TABLET | Refills: 3 | Status: SHIPPED | OUTPATIENT
Start: 2019-02-20 | End: 2019-09-30

## 2019-02-25 NOTE — TELEPHONE ENCOUNTER
PA is needed for azathioprine    Please call back,  Reference Case Number [id number], or fax PA back once received

## 2019-02-27 ENCOUNTER — TELEPHONE (OUTPATIENT)
Dept: TRANSPLANT | Facility: CLINIC | Age: 69
End: 2019-02-27

## 2019-02-27 NOTE — TELEPHONE ENCOUNTER
Prior Auth called in to [facility]    [employer]  [phone]  ID [id number]    Rep sent expedited request to pharmacy director for review    Azathioprine case ID [id number]    Prednisone case ID [id number]    PA pending

## 2019-02-27 NOTE — TELEPHONE ENCOUNTER
Patient Call: Medication Refill  Route to LPN    Pharmacy Name: [place], [place]    Name of Medication: Azathioprine and Prednisone   When will the patient be out of this medication?: Less than 3 days (Route high priority)    Needs Prior Auth  Please call Yfn # [phone]

## 2019-03-04 NOTE — TELEPHONE ENCOUNTER
Prior Authorization Approval    Authorization Effective Date:    Authorization Expiration Date:    Medication: azathioprine, prednisone  Approved Dose/Quantity: 3 months  Reference #:     Insurance Company: [facility] - Phone [phone] Fax [phone]  Expected CoPay:       CoPay Card Available:      Foundation Assistance Needed:    Which Pharmacy is filling the prescription (Not needed for infusion/clinic administered):    Pharmacy Notified: No  Patient Notified: No    We weren't sent pa approvals from plan,  Ran test claims and both were filled at retail pharmacy 2/27/2019.    [facility] Prior Authorization Team   Phone: [phone]  Fax: [phone]

## 2019-07-05 DIAGNOSIS — Z94.0 LIVING-DONOR KIDNEY TRANSPLANT RECIPIENT: ICD-10-CM

## 2019-07-05 DIAGNOSIS — Z94.0 KIDNEY REPLACED BY TRANSPLANT: Primary | ICD-10-CM

## 2019-07-08 RX ORDER — PREDNISONE 5 MG/1
5 TABLET ORAL DAILY
Qty: 30 TABLET | Refills: 2 | Status: SHIPPED | OUTPATIENT
Start: 2019-07-08 | End: 2019-11-04

## 2019-07-09 DIAGNOSIS — Z94.0 KIDNEY TRANSPLANTED: ICD-10-CM

## 2019-07-09 RX ORDER — AZATHIOPRINE 50 MG/1
100 TABLET ORAL DAILY
Qty: 60 TABLET | Refills: 11 | Status: SHIPPED | OUTPATIENT
Start: 2019-07-09 | End: 2020-08-03

## 2019-09-27 DIAGNOSIS — Z48.298 AFTERCARE FOLLOWING ORGAN TRANSPLANT: Primary | ICD-10-CM

## 2019-09-27 DIAGNOSIS — Z94.0 KIDNEY REPLACED BY TRANSPLANT: ICD-10-CM

## 2019-09-27 DIAGNOSIS — Z79.899 ENCOUNTER FOR LONG-TERM CURRENT USE OF MEDICATION: ICD-10-CM

## 2019-09-30 ENCOUNTER — OFFICE VISIT (OUTPATIENT)
Dept: NEPHROLOGY | Facility: CLINIC | Age: 69
End: 2019-09-30
Attending: INTERNAL MEDICINE
Payer: COMMERCIAL

## 2019-09-30 VITALS
DIASTOLIC BLOOD PRESSURE: 78 MMHG | OXYGEN SATURATION: 97 % | SYSTOLIC BLOOD PRESSURE: 121 MMHG | WEIGHT: 167.3 LBS | BODY MASS INDEX: 27.63 KG/M2 | HEART RATE: 59 BPM

## 2019-09-30 DIAGNOSIS — Z94.0 KIDNEY REPLACED BY TRANSPLANT: ICD-10-CM

## 2019-09-30 DIAGNOSIS — Z48.298 AFTERCARE FOLLOWING ORGAN TRANSPLANT: Primary | ICD-10-CM

## 2019-09-30 DIAGNOSIS — Z79.899 ENCOUNTER FOR LONG-TERM CURRENT USE OF MEDICATION: ICD-10-CM

## 2019-09-30 DIAGNOSIS — Z48.298 AFTERCARE FOLLOWING ORGAN TRANSPLANT: ICD-10-CM

## 2019-09-30 DIAGNOSIS — D84.9 IMMUNOSUPPRESSION (H): ICD-10-CM

## 2019-09-30 DIAGNOSIS — E55.9 VITAMIN D DEFICIENCY: ICD-10-CM

## 2019-09-30 LAB
ANION GAP SERPL CALCULATED.3IONS-SCNC: 5 MMOL/L (ref 3–14)
BUN SERPL-MCNC: 19 MG/DL (ref 7–30)
CALCIUM SERPL-MCNC: 8.6 MG/DL (ref 8.5–10.1)
CHLORIDE SERPL-SCNC: 106 MMOL/L (ref 94–109)
CO2 SERPL-SCNC: 26 MMOL/L (ref 20–32)
CREAT SERPL-MCNC: 0.78 MG/DL (ref 0.66–1.25)
ERYTHROCYTE [DISTWIDTH] IN BLOOD BY AUTOMATED COUNT: 12.6 % (ref 10–15)
GFR SERPL CREATININE-BSD FRML MDRD: >90 ML/MIN/{1.73_M2}
GLUCOSE SERPL-MCNC: 113 MG/DL (ref 70–99)
HCT VFR BLD AUTO: 40.1 % (ref 40–53)
HGB BLD-MCNC: 13 G/DL (ref 13.3–17.7)
MCH RBC QN AUTO: 32.5 PG (ref 26.5–33)
MCHC RBC AUTO-ENTMCNC: 32.4 G/DL (ref 31.5–36.5)
MCV RBC AUTO: 100 FL (ref 78–100)
PLATELET # BLD AUTO: 230 10E9/L (ref 150–450)
POTASSIUM SERPL-SCNC: 4.5 MMOL/L (ref 3.4–5.3)
PROT UR-MCNC: 0.21 G/L
PROT/CREAT 24H UR: 0.33 G/G CR (ref 0–0.2)
RBC # BLD AUTO: 4 10E12/L (ref 4.4–5.9)
SODIUM SERPL-SCNC: 137 MMOL/L (ref 133–144)
WBC # BLD AUTO: 6.3 10E9/L (ref 4–11)

## 2019-09-30 PROCEDURE — 36415 COLL VENOUS BLD VENIPUNCTURE: CPT | Performed by: INTERNAL MEDICINE

## 2019-09-30 PROCEDURE — 80048 BASIC METABOLIC PNL TOTAL CA: CPT | Performed by: INTERNAL MEDICINE

## 2019-09-30 PROCEDURE — 84156 ASSAY OF PROTEIN URINE: CPT | Performed by: INTERNAL MEDICINE

## 2019-09-30 PROCEDURE — 85027 COMPLETE CBC AUTOMATED: CPT | Performed by: INTERNAL MEDICINE

## 2019-09-30 PROCEDURE — G0463 HOSPITAL OUTPT CLINIC VISIT: HCPCS | Mod: ZF

## 2019-09-30 NOTE — PROGRESS NOTES
CHRONIC TRANSPLANT NEPHROLOGY VISIT    Assessment & Plan   # LDKT: {trend:087686768}   - Baseline Cr ~ ***   - Proteinuria: { :000580491}   - Date DSA Last Checked: Not Known       Latest DSA: Not checked recently due to time from transplant   - BK Viremia: Not checked recently   - Kidney Tx Biopsy: No      # Immunosuppression: Azathioprine (dose 100 mg daily) and Prednisone (dose 5 mg daily)   - Changes: { :257334}    # Prophylaxis:   - PJP: Sulfa/TMP (Bactrim)    # Hypertension: {BP Control:549111211}; Goal BP: { :042128920}   - Changes: { :995697}    {# Diabetes/Elevated BG (Optional)    :521058371}    {# Anemia/Erythrocytosis (Optional)    :333113103}    # Mineral Bone Disorder:   - Secondary renal hyperparathyroidism; PTH level: Not checked recently  - Vitamin D; level: Not checked recently  - Calcium; level: Normal  - Phosphorus; level: Not checked recently       # Electrolytes:   - Potassium; level: Normal  - Magnesium; level: Not checked recently  - Bicarbonate; level: Normal  - Sodium; level: Normal      # Dyslipidemia: ***    # Skin Cancer Risk:    - Discussed sun protection and recommend regular follow up with Dermatology.    # Medical Compliance: { :165852408}    # Transplant History:  Etiology of kidney failure: Obstructive nephropathy  Tx: LDKT  Transplant: 8/6/1985 (Kidney)  Donor Type:  Donor Class:   Significant changes in immunosuppression: { :688402}  Significant transplant-related complications: { :555926384}    Transplant Office Phone Number: [phone]    Assessment and plan was discussed with the patient and he voiced his understanding and agreement.    Return visit: No follow-ups on file.      Chief Complaint   Mr. Cavazos is a 69 year old here for { :024486850}.    History of Present Illness   Mr. 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 He was last seen by Dr. Baumann on 8/2/2018, see note for further details.  "    Recent Hospitalizations:  [] No [] Yes    New Medical Issues: [] No [] Yes    Decreased energy: [] No [] Yes    Chest pain or SOB with exertion:  [] No [] Yes    Appetite change or weight change: [] No [] Yes    Nausea, vomiting or diarrhea:  [] No [] Yes    Fever, sweats or chills: [] No [] Yes    Leg swelling: [] No [] Yes      Home BP: { :20154867}    Review of Systems   {ROS:060034459}    Problem List   Patient Active Problem List   Diagnosis     Kidney replaced by transplant     Immunosuppression (H)     Mixed hyperlipidemia     Aftercare following organ transplant       Social History   Social History     Tobacco Use     Smoking status: Never Smoker     Smokeless tobacco: Never Used   Substance Use Topics     Alcohol use: Not on file     Drug use: Not on file       Allergies   Allergies   Allergen Reactions     Contrast Dye Rash       Medications   Current Outpatient Medications   Medication Sig     azaTHIOprine (IMURAN) 50 MG tablet Take 2 tablets (100 mg) by mouth daily     pravastatin (PRAVACHOL) 40 MG tablet Take 1 tablet (40 mg) by mouth daily     predniSONE (DELTASONE) 5 MG tablet Take 1 tablet (5 mg) by mouth daily     predniSONE (DELTASONE) 5 MG tablet Take 5 mg by mouth daily.     sulfamethoxazole-trimethoprim (BACTRIM/SEPTRA) 400-80 MG per tablet Take 1 tablet by mouth daily     No current facility-administered medications for this visit.      There are no discontinued medications.    Physical Exam   Vital Signs: There were no vitals taken for this visit.    {EXAM    :[id number]::\"GENERAL APPEARANCE: alert and no distress\",\"HENT: mouth without ulcers or lesions\",\"LYMPHATICS: no cervical or supraclavicular nodes\",\"RESP: lungs clear to auscultation - no rales, rhonchi or wheezes\",\"CV: regular rhythm, normal rate, no rub, no murmur\",\"EDEMA: no LE edema bilaterally\",\"ABDOMEN: soft, nondistended, nontender, bowel sounds normal\",\"MS: extremities normal - no gross deformities noted, no evidence of " "inflammation in joints, no muscle tenderness\",\"SKIN: no rash\"}      Data     Renal Latest Ref Rng & Units 4/3/2019 11/20/2018 8/2/2018   Na 133 - 144 mmol/L - - 138   Na (external) 136 - 145 mmol/L 139 142 -   K 3.4 - 5.3 mmol/L - - 4.3   K (external) 3.5 - 5.1 mmol/L 4.2 4.0 -   Cl 94 - 109 mmol/L - - 106   Cl (external) 98 - 109 mmol/L 106 109 -   CO2 20 - 32 mmol/L - - 24   CO2 (external) 20 - 29 mmol/L 25 23 -   BUN 7 - 30 mg/dL - - 20   BUN (external) 7 - 26 mg/dL 21 21 -   Cr 0.66 - 1.25 mg/dL - - 0.82   Cr (external) 0.73 - 1.18 mg/dL 0.92 0.78 -   Glucose 70 - 99 mg/dL - - 101(H)   Glucose (external) 70 - 100 mg/dL 106(H) 110(H) -   Ca  8.5 - 10.1 mg/dL - - 8.5   Ca (external) 8.4 - 10.4 mg/dL 8.6 8.8 -     Bone Health Latest Ref Rng & Units 8/2/2018 4/21/2014 8/19/2008   Phos 2.5 - 4.5 mg/dL 3.2 - 3.6   PTHi 12 - 72 pg/mL - 90(H) 83(H)     Heme Latest Ref Rng & Units 4/3/2019 11/20/2018 8/2/2018   WBC 4.0 - 11.0 10e9/L - - 7.3   WBC (external) 3.5 - 10.5 10(9)L 5.4 5.0 -   Hgb 13.3 - 17.7 g/dL - - 12.7(L)   Hgb (external) 13.5 - 17.5 g/dL 12.6(L) 12.5(L) -   Plt 150 - 450 10e9/L - - 242   Plt (external) 150 - 450 10(9)L 205 241 -     Liver Latest Ref Rng & Units 8/2/2018 2/27/2017 12/1/2014   AP 40 - 150 U/L - 72 -   AP (external) 38 - 126 U/L - - 60   TBili 0.2 - 1.3 mg/dL - 0.6 -   TBili (external) 0.2 - 1.3 mg/dL - - 1.0   DBili 0.0 - 0.2 mg/dL - 0.1 -   DBili (external) 0.0 - 0.3 mg/dL - - 0.2   ALT 0 - 70 U/L - 23 -   ALT (external) 12 - 78 U/L - - 28   AST 0 - 45 U/L - 11 -   AST (external) 0 - 40 U/L - - 15   Tot Protein 6.8 - 8.8 g/dL - 7.3 -   Tot Protein (external) 6.3 - 8.2 g/dl - - 7.1   Albumin 3.4 - 5.0 g/dL 3.8 3.9 -   Albumin (external) 3.5 - 5.0 g/cil - - 3.6     Pancreas Latest Ref Rng & Units 8/2/2018 12/1/2014   A1C 0 - 5.6 % 5.6 -   A1C (external) 4.3 - 5.6 % - 5.6        [facility] Txp Virology Latest Ref Rng & Units 8/25/2010   BK Spec - Plasma, EDTA anticoagulant   BK Res <1000 copies/mL " <1000   BK Log <3.0 Log copies/mL <3.0                Scribe Disclosure:  IPaula, am serving as a scribe to document services personally performed by Kidney/Pancreas Recipient at this visit, based upon the provider's statements to me. All documentation has been reviewed by the aforementioned provider prior to being entered into the official medical record.     IPaula, a scribe, prepared the chart for today's encounter.
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
## 2019-10-04 ENCOUNTER — HEALTH MAINTENANCE LETTER (OUTPATIENT)
Age: 69
End: 2019-10-04

## 2019-11-04 DIAGNOSIS — Z94.0 LIVING-DONOR KIDNEY TRANSPLANT RECIPIENT: ICD-10-CM

## 2019-11-04 DIAGNOSIS — Z94.0 KIDNEY REPLACED BY TRANSPLANT: Primary | ICD-10-CM

## 2019-11-05 RX ORDER — PREDNISONE 5 MG/1
5 TABLET ORAL DAILY
Qty: 90 TABLET | Refills: 3 | Status: SHIPPED | OUTPATIENT
Start: 2019-11-05 | End: 2020-10-15

## 2019-12-20 DIAGNOSIS — Z94.0 LIVING-DONOR KIDNEY TRANSPLANT RECIPIENT: ICD-10-CM

## 2019-12-20 DIAGNOSIS — Z94.0 KIDNEY REPLACED BY TRANSPLANT: ICD-10-CM

## 2019-12-20 RX ORDER — SULFAMETHOXAZOLE AND TRIMETHOPRIM 400; 80 MG/1; MG/1
TABLET ORAL
Qty: 90 TABLET | Refills: 3 | Status: SHIPPED | OUTPATIENT
Start: 2019-12-20 | End: 2020-10-15

## 2020-01-21 ENCOUNTER — TELEPHONE (OUTPATIENT)
Dept: TRANSPLANT | Facility: CLINIC | Age: 70
End: 2020-01-21

## 2020-01-21 NOTE — TELEPHONE ENCOUNTER
Prior Authorization Specialty Medication Request    Medication/Dose:   azaTHIOprine (IMURAN) 50 MG tablet Take 2 tablets (100 mg) by mouth daily     ICD code (if different than what is on RX):  Z94.0  Previously Tried and Failed:      Important Lab Values:   Rationale:     Insurance Name: Blue Cross Blue Shield  Insurance ID: [id number]  Insurance Phone Number: [phone]    Pharmacy Information (if different than what is on RX)  Name:  [facility]  Phone:

## 2020-01-22 NOTE — TELEPHONE ENCOUNTER
PA Initiation    Medication: AZATHIOPRINE 50MG - B VS D PRIOR AUTH REQUIRED  Insurance Company: [employer] [place] - Phone [phone] Fax [phone]  Pharmacy Filling the Rx: [facility] [address] [place] [address] [address]  Filling Pharmacy Phone:    Filling Pharmacy Fax:    Start Date: 1/22/2020    Verified per call to Medicare CO[employer], rep Oralia #[id number] confirmed patient had previous ESRD Medicare part A effective 05/01/1985 termed 08/31/1988, part b effective 05/01/1985 termed 03/31/1989.  Then reactivated 07/01/14-present. Due to this immunos should be billed to Medicare Part B benefits with advantage plan through [facility].

## 2020-01-23 NOTE — TELEPHONE ENCOUNTER
Prior Authorization Approval    Authorization Effective Date:    Authorization Expiration Date:  NO END DATE  Medication: AZATHIOPRINE 50MG - B PRIOR AUTH APPROVED  Approved Dose/Quantity:   Reference #: [id number]   Insurance Company: [employer] [place] - Phone [phone] Fax [phone]  Expected CoPay:     5.19  CoPay Card Available:      Foundation Assistance Needed:    Which Pharmacy is filling the prescription (Not needed for infusion/clinic administered): [facility] [address] [place] [address] [address]  Pharmacy Notified:    Patient Notified:      Jessica Moses will reach out to the patient.

## 2020-02-08 ENCOUNTER — HEALTH MAINTENANCE LETTER (OUTPATIENT)
Age: 70
End: 2020-02-08

## 2020-04-17 ENCOUNTER — TELEPHONE (OUTPATIENT)
Dept: TRANSPLANT | Facility: CLINIC | Age: 70
End: 2020-04-17

## 2020-04-17 NOTE — TELEPHONE ENCOUNTER
Provider Call: Transplant Lab/Orders  Route to LPN  Post Transplant Days: [id number]  When patient is less than 60 days post-transplant, route high priority  Reason for Call: updated lab order   Liver patients reporting abnormal lab results: Route to RN and Page  Document lab facility information when provider is calling about annual lab orders. Delete facility wildcards when not needed.  Facility Name: [facility]: [facility] Fax Number: [phone]  Callback needed? If needed  Labs and urine was collected

## 2020-04-17 NOTE — LETTER
[facility]  Room 2[address]200  [facility],  [address]  [address]  Tel [phone]  Toll Free [phone]                OUTPATIENT LABORATORY TEST ORDER    Patient Name: Yfn Cavazos  Transplant Date: 8/6/1985 (Kidney)  YOB: 1950  Issue Date & Time:April 17, 20201:27 PM    [facility] MR:  [id number]  Exp. Date (1 year after date issued)      Diagnoses: Kidney Transplant (ICD-10 Z94.0)   Long term use of medications (ICD-10 79.889)     Lab results to be available on the same day drawn.   Patient should release information to the [facility].  Please fax to the Transplant Center at [phone].    Every 3 Months   ?Hemogram and Platelet   ?Basic Metabolic Panel (Sodium, Potassium, Chloride, CO2, Creatinine, Urea Nitrogen, Glucose, Calcium)    Every 6 Months                                            ?Urine for protein/creatinine      If you have any questions, please call The Transplant Center at [phone] or [phone].    Please fax labs to [phone]    Scout Baumann

## 2020-08-03 ENCOUNTER — TELEPHONE (OUTPATIENT)
Dept: TRANSPLANT | Facility: CLINIC | Age: 70
End: 2020-08-03

## 2020-08-03 DIAGNOSIS — Z94.0 KIDNEY TRANSPLANTED: ICD-10-CM

## 2020-08-03 DIAGNOSIS — Z79.899 ENCOUNTER FOR LONG-TERM CURRENT USE OF MEDICATION: Primary | ICD-10-CM

## 2020-08-03 RX ORDER — AZATHIOPRINE 50 MG/1
100 TABLET ORAL DAILY
Qty: 60 TABLET | Refills: 11 | Status: SHIPPED | OUTPATIENT
Start: 2020-08-03 | End: 2021-11-24

## 2020-08-03 NOTE — TELEPHONE ENCOUNTER
Patient Call: General    Reason for call: Yfn needs a prescription refilled through Dr. Baumann. He is a kidney transplant pt. They have been trying to contact you to get it refilled so Yfn can get his meds.  Also, Yfn is supposed to have a yearly appointment every August to meet w/Kidney Coordinator. Wondering if you are not having pts coming in due to Covid.    Call back needed? Yes    Return Call Needed  Same as documented in contacts section  When to return call?: Same day: Route High Priority

## 2020-08-03 NOTE — TELEPHONE ENCOUNTER
Spoke with Yfn. He needs Aza refill. Confirmed pharmacy and prescription refill sent. Discussed with Yfn we are using video visits thru [facility] will have schedulers reach out to schedule his next annual appointment (message sent). He states he is doing well.

## 2020-08-04 ENCOUNTER — TELEPHONE (OUTPATIENT)
Dept: TRANSPLANT | Facility: CLINIC | Age: 70
End: 2020-08-04

## 2020-08-04 NOTE — TELEPHONE ENCOUNTER
Left message for patient to confirm scheduled virtual nerprology appointments on 10/15/20.  Asked patient to call back to confirm appointments dates and times.

## 2020-09-14 ENCOUNTER — DOCUMENTATION ONLY (OUTPATIENT)
Dept: CARE COORDINATION | Facility: CLINIC | Age: 70
End: 2020-09-14

## 2020-10-15 ENCOUNTER — VIRTUAL VISIT (OUTPATIENT)
Dept: NEPHROLOGY | Facility: CLINIC | Age: 70
End: 2020-10-15
Attending: INTERNAL MEDICINE
Payer: COMMERCIAL

## 2020-10-15 DIAGNOSIS — Z29.89 NEED FOR PNEUMOCYSTIS PROPHYLAXIS: ICD-10-CM

## 2020-10-15 DIAGNOSIS — Z94.0 KIDNEY REPLACED BY TRANSPLANT: Primary | ICD-10-CM

## 2020-10-15 DIAGNOSIS — E78.00 HYPERCHOLESTEREMIA: ICD-10-CM

## 2020-10-15 DIAGNOSIS — I10 ESSENTIAL HYPERTENSION: ICD-10-CM

## 2020-10-15 DIAGNOSIS — Z48.298 AFTERCARE FOLLOWING ORGAN TRANSPLANT: ICD-10-CM

## 2020-10-15 DIAGNOSIS — Z94.0 LIVING-DONOR KIDNEY TRANSPLANT RECIPIENT: ICD-10-CM

## 2020-10-15 DIAGNOSIS — D84.9 IMMUNOSUPPRESSION (H): ICD-10-CM

## 2020-10-15 PROCEDURE — 99214 OFFICE O/P EST MOD 30 MIN: CPT | Mod: 95

## 2020-10-15 RX ORDER — PREDNISONE 5 MG/1
5 TABLET ORAL DAILY
Qty: 90 TABLET | Refills: 3 | Status: SHIPPED | OUTPATIENT
Start: 2020-10-15 | End: 2022-04-25

## 2020-10-15 RX ORDER — SULFAMETHOXAZOLE AND TRIMETHOPRIM 400; 80 MG/1; MG/1
1 TABLET ORAL DAILY
Qty: 90 TABLET | Refills: 11 | Status: SHIPPED | OUTPATIENT
Start: 2020-10-15 | End: 2022-05-23

## 2020-10-15 RX ORDER — PRAVASTATIN SODIUM 40 MG
40 TABLET ORAL DAILY
Qty: 90 TABLET | Refills: 4 | Status: SHIPPED | OUTPATIENT
Start: 2020-10-15
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
## 2020-11-05 NOTE — LETTER
[facility]  Room 2-200  [facility],  [address]  [address]  Tel [phone]  Toll Free [phone]                OUTPATIENT LABORATORY TEST ORDER    Patient Name: Yfn Cavazos  Transplant Date: 8/6/1985 (Kidney)  YOB: 1950  Issue Date & Time: 11/5/20 12:36 PM    [facility] MR:  [id number]  Exp. Date (1 year after date issued)      Diagnoses: Kidney Transplant (ICD-10 Z94.0)   Long term use of medications (ICD-10 79.889)     Lab results to be available on the same day drawn.   Patient should release information to the [facility], [facility].  Please fax to the Transplant Center at [phone].    Every 3 Months   ?Hemogram and Platelet   ?Basic Metabolic Panel (Sodium, Potassium, Chloride, CO2, Creatinine, Urea Nitrogen, Glucose, Calcium)    Every 6 Months                                            ?Urine for protein/creatinine      If you have any questions, please call [facility] at [phone].  Please fax labs to [phone]      Jose Broderick MD

## 2021-03-28 ENCOUNTER — HEALTH MAINTENANCE LETTER (OUTPATIENT)
Age: 71
End: 2021-03-28

## 2021-07-20 ENCOUNTER — TELEPHONE (OUTPATIENT)
Dept: TRANSPLANT | Facility: CLINIC | Age: 71
End: 2021-07-20

## 2021-09-11 ENCOUNTER — HEALTH MAINTENANCE LETTER (OUTPATIENT)
Age: 71
End: 2021-09-11

## 2021-10-06 DIAGNOSIS — Z94.0 KIDNEY REPLACED BY TRANSPLANT: Primary | ICD-10-CM

## 2021-10-06 DIAGNOSIS — Z48.298 AFTERCARE FOLLOWING ORGAN TRANSPLANT: ICD-10-CM

## 2021-10-06 DIAGNOSIS — Z79.899 ENCOUNTER FOR LONG-TERM CURRENT USE OF MEDICATION: ICD-10-CM

## 2021-11-24 ENCOUNTER — TELEPHONE (OUTPATIENT)
Dept: TRANSPLANT | Facility: CLINIC | Age: 71
End: 2021-11-24
Payer: COMMERCIAL

## 2021-11-24 DIAGNOSIS — Z94.0 KIDNEY TRANSPLANTED: ICD-10-CM

## 2021-11-24 DIAGNOSIS — Z79.899 ENCOUNTER FOR LONG-TERM CURRENT USE OF MEDICATION: ICD-10-CM

## 2021-11-24 RX ORDER — AZATHIOPRINE 50 MG/1
100 TABLET ORAL DAILY
Qty: 60 TABLET | Refills: 11 | Status: SHIPPED | OUTPATIENT
Start: 2021-11-24 | End: 2022-10-28

## 2021-11-24 NOTE — TELEPHONE ENCOUNTER
Patient Call: Medication Refill  Route to LPN  Instruct the patient to first contact their pharmacy. If they have called their pharmacy and require further assistance, route to LPN.    Pharmacy Name: [facility]  Pharmacy Location: [place]  Name of Medication: AZA  monthly supply    When will the patient be out of this medication?: Less than 3 days (Route high priority)

## 2022-02-22 ENCOUNTER — TELEPHONE (OUTPATIENT)
Dept: TRANSPLANT | Facility: CLINIC | Age: 72
End: 2022-02-22
Payer: COMMERCIAL

## 2022-02-22 NOTE — TELEPHONE ENCOUNTER
Patient Call: Voicemail    Date/Time: 02/22/22 11:48am    Reason for call: Patient needs to reschedule 04/11/2022 appointment.

## 2022-02-22 NOTE — TELEPHONE ENCOUNTER
February 22, 2022 1:37 PM - Clinton Ansari CNA: pt called to [facility] neph appt [facility] April to June

## 2022-04-23 ENCOUNTER — HEALTH MAINTENANCE LETTER (OUTPATIENT)
Age: 72
End: 2022-04-23

## 2022-04-25 DIAGNOSIS — Z94.0 KIDNEY REPLACED BY TRANSPLANT: Primary | ICD-10-CM

## 2022-04-25 DIAGNOSIS — Z94.0 LIVING-DONOR KIDNEY TRANSPLANT RECIPIENT: ICD-10-CM

## 2022-04-25 RX ORDER — PREDNISONE 5 MG/1
5 TABLET ORAL DAILY
Qty: 90 TABLET | Refills: 3 | Status: SHIPPED | OUTPATIENT
Start: 2022-04-25

## 2022-04-25 NOTE — TELEPHONE ENCOUNTER
Medication Refill  Route to LPN  Instruct the patient to first contact their pharmacy. If they have called their pharmacy and require further assistance, route to LPN.    Pharmacy Name: [id number] [address]      Name of Medication: predniSONE (DELTASONE) 5 MG tablet   Dose: Take 1 tablet (5 mg) by mouth daily, Disp-90 tablet, R-3, E-Prescribe    When will the patient be out of this medication?: Less than 3 days (Route high priority)

## 2022-05-21 DIAGNOSIS — Z94.0 KIDNEY REPLACED BY TRANSPLANT: Primary | ICD-10-CM

## 2022-05-21 DIAGNOSIS — Z94.0 LIVING-DONOR KIDNEY TRANSPLANT RECIPIENT: ICD-10-CM

## 2022-05-23 RX ORDER — SULFAMETHOXAZOLE AND TRIMETHOPRIM 400; 80 MG/1; MG/1
1 TABLET ORAL DAILY
Qty: 90 TABLET | Refills: 0 | Status: SHIPPED | OUTPATIENT
Start: 2022-05-23 | End: 2022-06-24

## 2022-06-21 ENCOUNTER — OFFICE VISIT (OUTPATIENT)
Dept: NEPHROLOGY | Facility: CLINIC | Age: 72
End: 2022-06-21
Attending: INTERNAL MEDICINE
Payer: COMMERCIAL

## 2022-06-21 ENCOUNTER — LAB (OUTPATIENT)
Dept: LAB | Facility: CLINIC | Age: 72
End: 2022-06-21
Payer: COMMERCIAL

## 2022-06-21 VITALS
OXYGEN SATURATION: 95 % | WEIGHT: 159.8 LBS | SYSTOLIC BLOOD PRESSURE: 143 MMHG | HEART RATE: 64 BPM | DIASTOLIC BLOOD PRESSURE: 81 MMHG | BODY MASS INDEX: 26.39 KG/M2

## 2022-06-21 DIAGNOSIS — Z94.0 KIDNEY REPLACED BY TRANSPLANT: Primary | ICD-10-CM

## 2022-06-21 DIAGNOSIS — E78.2 MIXED HYPERLIPIDEMIA: ICD-10-CM

## 2022-06-21 DIAGNOSIS — Z79.52 LONG-TERM CORTICOSTEROID USE: ICD-10-CM

## 2022-06-21 DIAGNOSIS — Z94.0 KIDNEY REPLACED BY TRANSPLANT: ICD-10-CM

## 2022-06-21 DIAGNOSIS — Z48.298 AFTERCARE FOLLOWING ORGAN TRANSPLANT: ICD-10-CM

## 2022-06-21 DIAGNOSIS — D84.9 IMMUNOSUPPRESSION (H): ICD-10-CM

## 2022-06-21 DIAGNOSIS — Z79.899 ENCOUNTER FOR LONG-TERM CURRENT USE OF MEDICATION: ICD-10-CM

## 2022-06-21 DIAGNOSIS — Z79.624 ENCOUNTER FOR LONG TERM CURRENT USE OF AZATHIOPRINE: ICD-10-CM

## 2022-06-21 DIAGNOSIS — R73.9 HYPERGLYCEMIA: ICD-10-CM

## 2022-06-21 LAB
ANION GAP SERPL CALCULATED.3IONS-SCNC: 9 MMOL/L (ref 3–14)
BUN SERPL-MCNC: 20 MG/DL (ref 7–30)
CALCIUM SERPL-MCNC: 8.8 MG/DL (ref 8.5–10.1)
CD3 CELLS # BLD: 1816 CELLS/UL (ref 603–2990)
CD3 CELLS NFR BLD: 95 % (ref 49–84)
CD3+CD4+ CELLS # BLD: 1569 CELLS/UL (ref 441–2156)
CD3+CD4+ CELLS NFR BLD: 82 % (ref 28–63)
CD3+CD4+ CELLS/CD3+CD8+ CLL BLD: 8.58 % (ref 1.4–2.6)
CD3+CD8+ CELLS # BLD: 183 CELLS/UL (ref 125–1312)
CD3+CD8+ CELLS NFR BLD: 10 % (ref 10–40)
CHLORIDE BLD-SCNC: 108 MMOL/L (ref 94–109)
CO2 SERPL-SCNC: 25 MMOL/L (ref 20–32)
CREAT SERPL-MCNC: 0.72 MG/DL (ref 0.66–1.25)
CREAT UR-MCNC: 44 MG/DL
ERYTHROCYTE [DISTWIDTH] IN BLOOD BY AUTOMATED COUNT: 12.5 % (ref 10–15)
GFR SERPL CREATININE-BSD FRML MDRD: >90 ML/MIN/1.73M2
GLUCOSE BLD-MCNC: 106 MG/DL (ref 70–99)
HCT VFR BLD AUTO: 35.5 % (ref 40–53)
HGB BLD-MCNC: 12.2 G/DL (ref 13.3–17.7)
MCH RBC QN AUTO: 33.2 PG (ref 26.5–33)
MCHC RBC AUTO-ENTMCNC: 34.4 G/DL (ref 31.5–36.5)
MCV RBC AUTO: 97 FL (ref 78–100)
PLATELET # BLD AUTO: 210 10E3/UL (ref 150–450)
POTASSIUM BLD-SCNC: 3.9 MMOL/L (ref 3.4–5.3)
PROT UR-MCNC: 0.13 G/L
PROT/CREAT 24H UR: 0.3 G/G CR (ref 0–0.2)
RBC # BLD AUTO: 3.67 10E6/UL (ref 4.4–5.9)
SODIUM SERPL-SCNC: 142 MMOL/L (ref 133–144)
T CELL COMMENT: ABNORMAL
WBC # BLD AUTO: 5.6 10E3/UL (ref 4–11)

## 2022-06-21 PROCEDURE — 85027 COMPLETE CBC AUTOMATED: CPT | Performed by: PATHOLOGY

## 2022-06-21 PROCEDURE — 36415 COLL VENOUS BLD VENIPUNCTURE: CPT | Performed by: PATHOLOGY

## 2022-06-21 PROCEDURE — 84156 ASSAY OF PROTEIN URINE: CPT | Performed by: PATHOLOGY

## 2022-06-21 PROCEDURE — 86360 T CELL ABSOLUTE COUNT/RATIO: CPT | Performed by: INTERNAL MEDICINE

## 2022-06-21 PROCEDURE — 80048 BASIC METABOLIC PNL TOTAL CA: CPT | Performed by: PATHOLOGY

## 2022-06-21 PROCEDURE — 99214 OFFICE O/P EST MOD 30 MIN: CPT | Performed by: INTERNAL MEDICINE

## 2022-06-21 PROCEDURE — G0463 HOSPITAL OUTPT CLINIC VISIT: HCPCS

## 2022-06-21 ASSESSMENT — PAIN SCALES - GENERAL: PAINLEVEL: NO PAIN (0)

## 2022-06-21 NOTE — PATIENT INSTRUCTIONS
The name of the medication that could be used instead of Azathioprine is called Mycophenolate Mofetil.     It may help your skin health. We can run a test prescription to see what it would cost.     It would be reasonable to talk to your dermatologist about this. I am happy to speak with them if they have questions.     I would recommend a bone density scan as you have been on prednisone for a long time and there are many options to treat low bone density. Talk to your primary about this.     We are checking a blood test to see if you need to continue Bactrim or not.     I recommend we have a follow up visit with you in 1 year's time.

## 2022-06-21 NOTE — NURSING NOTE
Chief Complaint   Patient presents with     RECHECK     Annual follow up     Blood pressure (!) 143/81, pulse 64, weight 72.5 kg (159 lb 12.8 oz), SpO2 95 %.    Patricia Diego
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
## 2022-06-24 ENCOUNTER — TELEPHONE (OUTPATIENT)
Dept: TRANSPLANT | Facility: CLINIC | Age: 72
End: 2022-06-24

## 2022-06-24 NOTE — TELEPHONE ENCOUNTER
----- Message from Hay Arriaga MD sent at 6/22/2022 12:51 PM CDT -----  Let's stop his Bactrim     Thanks     Noe   ----- Message -----  From: Glory Fuentes RN  Sent: 6/22/2022   9:06 AM CDT  To: Hay Arriaga MD    Results sent to Dr Arriaga for review    OUTCOME:  Patient v/u of stopping bactrim    Glory Fuentes RN   Transplant Coordinator  [phone]

## 2022-06-27 ENCOUNTER — TELEPHONE (OUTPATIENT)
Dept: NEPHROLOGY | Facility: CLINIC | Age: 72
End: 2022-06-27

## 2022-06-27 NOTE — TELEPHONE ENCOUNTER
PA Initiation    Medication: Mycophenolate- PA Pending  Insurance Company: [employer] - Phone [phone] Fax [phone]  Pharmacy Filling the Rx: [facility] [address] [place] [address] [address]  Filling Pharmacy Phone: [phone]  Filling Pharmacy Fax:    Start Date: 6/27/2022

## 2022-06-29 NOTE — TELEPHONE ENCOUNTER
Prior Authorization Approval    Authorization Effective Date: 3/30/2022  Authorization Expiration Date: 12/1/2025- until as long as patien tis enrolled on their plan  Medication: Mycophenolate- PA Approved  Approved Dose/Quantity: UD  Reference #:     Insurance Company: [employer] - Phone [phone] Fax [phone]  Expected CoPay:     $11.07  CoPay Card Available:      Foundation Assistance Needed:    Which Pharmacy is filling the prescription (Not needed for infusion/clinic administered): [facility] - [place], [place] - [address]  Pharmacy Notified: No  Patient Notified: No

## 2022-08-31 ENCOUNTER — TELEPHONE (OUTPATIENT)
Dept: TRANSPLANT | Facility: CLINIC | Age: 72
End: 2022-08-31

## 2022-08-31 DIAGNOSIS — Z94.0 KIDNEY REPLACED BY TRANSPLANT: Primary | ICD-10-CM

## 2022-08-31 NOTE — LETTER
OUTPATIENT LABORATORY TEST ORDER    Patient Name: Yfn Cavazos  Transplant Date: 8/6/1985 (Kidney)  YOB: 1950                                                        Issue Date & Time: 5/23/2022  8:52 AM    [facility] MR:  [id number]  Exp. Date (1 year after date issued)      Diagnoses: Kidney Transplant (ICD-10 Z94.0)   Long term use of medications (ICD-10 79.889)     Lab results to be available on the same day drawn.   Patient should release information to the [facility] Transplant Center.  Please fax to the Transplant Center at [phone].    Every 3 Months   ?Hemogram and Platelet   ?Basic Metabolic Panel (Sodium, Potassium, Chloride, CO2, Creatinine, Urea Nitrogen, Glucose, Calcium)    Every 6 Months                                            ?Urine for protein/creatinine      If you have any questions, please call The Transplant Center at [phone].  Please fax labs to [phone]      Jose Broderick MD

## 2022-09-01 RX ORDER — MYCOPHENOLATE MOFETIL 250 MG/1
500 CAPSULE ORAL 2 TIMES DAILY
Qty: 120 CAPSULE | Refills: 11 | Status: SHIPPED | OUTPATIENT
Start: 2022-09-01 | End: 2023-05-03

## 2022-09-01 NOTE — TELEPHONE ENCOUNTER
----- Message from Ihsan Blair MD sent at 8/18/2022  9:25 AM CDT -----  Regarding: RE: MMF approval  Sure, but after one day, can decrease azathioprine to 50 mg daily and stop after 3 days.  ----- Message -----  From: Ginny Osei RN  Sent: 8/18/2022   8:47 AM CDT  To: Ihsan Blair MD, Glory Fuentes RN  Subject: RE: MMF approval                                 Do you want to overlap Aza 100 mg daily and mycophenolate 500 mg bid for three days?    ----- Message -----  From: Ihsan Blair MD  Sent: 8/14/2022  12:27 PM CDT  To: Glory Fuentes RN  Subject: RE: MMF approval                                 Yes, would change to mycophenolate mofetil 500 mg bid.  ----- Message -----  From: Glory Fuentes RN  Sent: 7/28/2022   2:28 PM CDT  To: Hay Arriaga MD  Subject: MMF approval                                     Yfn Waldrop does have coverage for MMF.      You had wanted me to look into this to make the switch from AZA to MMF with hx of skin ca.    Would you like to move forward with the switch?    Currently on  mg daily  Pred 5 mg daily    Glory Fuentes RN   Transplant Coordinator  [phone]    OUTCOME:  Patient v/u of making transition from AZA to mycophenolate. V/u of plan: after one day decrease azathioprine to 50 mg daily and stop after 3 days. RX sent to local pharmacy    Glory Fuentes RN   Transplant Coordinator  [phone]

## 2022-10-27 ENCOUNTER — TELEPHONE (OUTPATIENT)
Dept: TRANSPLANT | Facility: CLINIC | Age: 72
End: 2022-10-27

## 2022-10-27 NOTE — LETTER
OUTPATIENT LABORATORY TEST ORDER    Patient Name: Yfn Cavazos  Transplant Date: 8/6/1985 (Kidney)  YOB: 1950                                                        Issue Date & Time:10/28/2022 1040   [facility] MR:  [id number]  Exp. Date (1 year after date issued)      Diagnoses: Kidney Transplant (ICD-10 Z94.0)   Long term use of medications (ICD-10 79.889)     Lab results to be available on the same day drawn.   Patient should release information to the [facility].  Please fax to the Transplant Center at [phone].  Please obtain Mycophenolate 12 hour trough level with next labs draw    Every 3 Months   ?Hemogram and Platelet   ?Basic Metabolic Panel (Sodium, Potassium, Chloride, CO2, Creatinine, Urea Nitrogen, Glucose, Calcium)    Every 6 Months                                            ?Urine for protein/creatinine      If you have any questions, please call The Transplant Center at [phone].  Please fax labs to [phone]      Jose Broderick MD

## 2022-10-28 NOTE — TELEPHONE ENCOUNTER
ISSUE: Patient made transition from AZA to mycophenolate September 2022    PLAN: call patient to check in and ensure he is on mycophenolate and off the AZA. Obtain MPA level    OUTCOME: Patient states he is off the AZA and on mycophenolate.  Denies any GI upset or diarrhea.  Will obtain MPA level and full set of Transplant labs.    Orders sent.    Glory Fuentes RN   Transplant Coordinator  [phone]

## 2022-10-30 ENCOUNTER — HEALTH MAINTENANCE LETTER (OUTPATIENT)
Age: 72
End: 2022-10-30

## 2022-11-02 ENCOUNTER — TELEPHONE (OUTPATIENT)
Dept: TRANSPLANT | Facility: CLINIC | Age: 72
End: 2022-11-02

## 2022-11-02 NOTE — LETTER
PHYSICIAN ORDERS  Patient Name: Yfn Cavazos          Transplant Date: 8/6/1985 (Kidney)  YOB: 1950                   Issue Date & Time:11/2/2022 1520   [facility] MR:  [id number]                Exp. Date (1 year after date issued)    Diagnoses: Kidney Transplant (ICD-10 Z94.0)   Long term use of medications (ICD-10 79.889)     Lab results to be available on the same day drawn.   Patient should release information to the [facility].  Please fax to the Transplant Center at [phone].  Please obtain Mycophenolate 12 hour trough level with next labs draw    Every 3 Months   ?Hemogram and Platelet   ?Basic Metabolic Panel (Sodium, Potassium, Chloride, CO2, Creatinine, Urea Nitrogen, Glucose, Calcium)    Every 6 Months                                        Urine Protein creatinine ratio- random                  ?Urine for protein/creatinine ?Urine for protein/creatinine      If you have any questions, please call The Transplant Center at [phone].  Please fax labs to [phone]      Jose Broderick MD

## 2022-11-02 NOTE — LETTER
OUTPATIENT LABORATORY TEST ORDER    Patient Name: Yfn Cavazos  Transplant Date: 8/6/1985 (Kidney)  YOB: 1950                                                        Issue Date & Time:10/28/2022 1040   [facility] MR:  [id number]  Exp. Date (1 year after date issued)      Diagnoses: Kidney Transplant (ICD-10 Z94.0)   Long term use of medications (ICD-10 79.889)     Lab results to be available on the same day drawn.   Patient should release information to the [facility].  Please fax to the Transplant Center at [phone].  Please obtain Mycophenolate 12 hour trough level with next labs draw    Every 3 Months   ?Hemogram and Platelet   ?Basic Metabolic Panel (Sodium, Potassium, Chloride, CO2, Creatinine, Urea Nitrogen, Glucose, Calcium)    Every 6 Months                                            ?Urine for protein/creatinine      If you have any questions, please call The Transplant Center at [phone].  Please fax labs to [phone]      Jose Broderick MD

## 2022-11-02 NOTE — LETTER
PHYSICIAN ORDERS  Patient Name: Yfn Cavazos          Transplant Date: 8/6/1985 (Kidney)  YOB: 1950                   Issue Date & Time:11/2/2022 1520   [facility] MR:  [id number]                Exp. Date (1 year after date issued)    Diagnoses: Kidney Transplant (ICD-10 Z94.0)   Long term use of medications (ICD-10 79.889)     Lab results to be available on the same day drawn.   Patient should release information to the [facility].  Please fax to the Transplant Center at [phone].  Please obtain Mycophenolate 12 hour trough level with next labs draw    Every 3 Months   ?Hemogram and Platelet   ?Basic Metabolic Panel (Sodium, Potassium, Chloride, CO2, Creatinine, Urea Nitrogen, Glucose, Calcium)    Every 6 Months                                        Urine Protein creatinine ratio- random                  ?Urine for protein/creatinine ?Urine for protein/creatinine      If you have any questions, please call The Transplant Center at [phone].  Please fax labs to [phone]      Jose Broderick MD

## 2022-11-02 NOTE — TELEPHONE ENCOUNTER
Voicemail    Date/Time: 11/2/2022 @ 1:07PM    Reason for call: patient called in regards of being put on a new medication call mycophenolate and they wanted to do a lab test down at the [facility]. The patient was informed by the clinic that they hadn't recieved thet lab orders. Patient would like their coordniator to refax over again lab orders to this clinic. Patient would like a call back when orders are faxed over to clinic. Patient's number [phone]

## 2023-05-03 ENCOUNTER — TELEPHONE (OUTPATIENT)
Dept: TRANSPLANT | Facility: CLINIC | Age: 73
End: 2023-05-03
Payer: COMMERCIAL

## 2023-05-03 DIAGNOSIS — Z94.0 KIDNEY REPLACED BY TRANSPLANT: ICD-10-CM

## 2023-05-03 RX ORDER — MYCOPHENOLATE MOFETIL 250 MG/1
500 CAPSULE ORAL 2 TIMES DAILY
Qty: 120 CAPSULE | Refills: 11 | Status: SHIPPED | OUTPATIENT
Start: 2023-05-03 | End: 2023-05-25

## 2023-05-03 NOTE — TELEPHONE ENCOUNTER
Health Call Center    Phone Message    May a detailed message be left on voicemail: yes     Reason for Call: Medication Question or concern regarding medication   Prescription Clarification  Name of Medication: mycophenolate (GENERIC EQUIVALENT) 250 MG capsule  Prescribing Provider: Dr. Blair   Pharmacy: [facility]   What on the order needs clarification? Patient states that he is having a lot breakouts all over his body, and body aches since taking this medication. Patient states that this all started after he started this new medication. Please call patient back to discuss.      Action Taken: Message routed to:  [facility] ([facility]): Glory Fuentes RNCC    Travel Screening: Not Applicable

## 2023-05-10 ENCOUNTER — TELEPHONE (OUTPATIENT)
Dept: CARE COORDINATION | Facility: CLINIC | Age: 73
End: 2023-05-10
Payer: COMMERCIAL

## 2023-05-10 NOTE — LETTER
PHYSICIAN ORDERS  Patient Name: Yfn Cavazos          Transplant Date: 8/6/1985 (Kidney)  YOB: 1950                   Issue Date & Time:5/12/2023  [facility] MR:  [id number]                Exp. Date (1 year after date issued)    Diagnoses: Kidney Transplant (ICD-10 Z94.0)   Long term use of medications (ICD-10 79.889)     Lab results to be available on the same day drawn.   Patient should release information to the [facility] Transplant [facility].  Please fax to the Transplant Center at [phone].  Please obtain Mycophenolate 12 hour trough level with next labs draw  Every 3 Months: due now   ?Hemogram and Platelet   ?Basic Metabolic Panel (Sodium, Potassium, Chloride, CO2, Creatinine, Urea Nitrogen, Glucose, Calcium)    Every 6 Months                                        Urine Protein creatinine ratio- random                  ?Urine for protein/creatinine ?Urine for protein/creatinine      If you have any questions, please call The Transplant Center at [phone].  Please fax labs to [phone]      Jose Broderick MD

## 2023-05-10 NOTE — TELEPHONE ENCOUNTER
Provider Call: General  Route to LPN    Reason for call: pt called in with complaints of mycophenolate 1000mg says he needs to discuss other options does not like the side effects of medication     Call back needed? Yes    Return Call Needed  Same as documented in contacts section  When to return call?: Greater than one day: Route standard priority

## 2023-05-12 ENCOUNTER — TELEPHONE (OUTPATIENT)
Dept: TRANSPLANT | Facility: CLINIC | Age: 73
End: 2023-05-12
Payer: COMMERCIAL

## 2023-05-12 NOTE — TELEPHONE ENCOUNTER
Yfn wanting to speak with Coordinator regarding his mycophenolate patient stated he doesn't want to take this medication and would like the input from Coordinator

## 2023-05-12 NOTE — TELEPHONE ENCOUNTER
Duplicate encounter see phone call dated 5/10/2023    Glory Fuentes RN   Transplant Coordinator  [phone]

## 2023-05-12 NOTE — TELEPHONE ENCOUNTER
"ISSUE: patient calls with reports of \"  Feeling achy\"  Denies fevers,  Loss of appetite, weight loss, night sweats, N/V/D.  Overall other than feeling achy he states he is feeling good.    Patient beleeve the achy feeling is from MMF and he wishes to stop med.  RNCC recommended a full set of tx labs as he is overdue with a MPA level.  Patient agreeable.  Will obtain labs next week.    Order sent.  He has also seen PCP.    Glory Fuentes RN   Transplant Coordinator  [phone]    "

## 2023-05-24 ENCOUNTER — TELEPHONE (OUTPATIENT)
Dept: TRANSPLANT | Facility: CLINIC | Age: 73
End: 2023-05-24
Payer: COMMERCIAL

## 2023-05-24 DIAGNOSIS — Z94.0 KIDNEY REPLACED BY TRANSPLANT: ICD-10-CM

## 2023-05-24 NOTE — LETTER
PHYSICIAN ORDERS  Patient Name: Yfn Cavazos          Transplant Date: 8/6/1985 (Kidney)  YOB: 1950                   Issue Date & Time: 5/25/2023 1100   [facility] MR:  [id number]                Exp. Date (1 year after date issued)    Diagnoses: Kidney Transplant (ICD-10 Z94.0)   Long term use of medications (ICD-10 79.889)     Lab results to be available on the same day drawn.   Patient should release information to the [facility].  Please fax to the Transplant Center at [phone].  Please obtain the following labs in 1 week  Mycophenolic acid level   BMP  CBC        If you have any questions, please call The Transplant Center at [phone].  Please fax labs to [phone]      Jose Broderick MD

## 2023-05-25 RX ORDER — MYCOPHENOLATE MOFETIL 250 MG/1
CAPSULE ORAL
Qty: 90 CAPSULE | Refills: 11 | Status: SHIPPED | OUTPATIENT
Start: 2023-05-25 | End: 2024-08-20

## 2023-05-25 NOTE — TELEPHONE ENCOUNTER
Jose Pro MD Ututalum, Teresa, JOSEPHINE  Cc: Glory Fuentes, RN  Can reduce to 500 mg in am 250 mg in pm and recheck level in1 week           Previous Messages       ----- Message -----   From: Fabiana Vanessa RN   Sent: 5/22/2023  10:09 AM CDT   To: Jose Goodrich MD; Glory Fuentes RN   Subject: MMF                                               Dr. Broderick,     Dr. Arriaga switched him from AZA to MMF d/t skin Ca last Sept 2022.   He feels achy and believes MMF is causing it and wants to stop it.   Current MPA level 2.9 on 500 BID.   Are we okay to decrease dose?     Thanks,   Diego, covering for Glory        OUTCOME:  Patient v/u of decreasing MMF from 500 mg BID to  mg in the AM and 250 mg in the PM    Will repeat labs including a 12 hour MPA level in one week.  Lab orders sent, RX updated.    Glory Fuentes RN   Transplant Coordinator  [phone]

## 2023-06-01 ENCOUNTER — HEALTH MAINTENANCE LETTER (OUTPATIENT)
Age: 73
End: 2023-06-01

## 2023-09-21 ENCOUNTER — OFFICE VISIT (OUTPATIENT)
Dept: TRANSPLANT | Facility: CLINIC | Age: 73
End: 2023-09-21
Attending: INTERNAL MEDICINE
Payer: COMMERCIAL

## 2023-09-21 VITALS
DIASTOLIC BLOOD PRESSURE: 87 MMHG | OXYGEN SATURATION: 97 % | HEART RATE: 70 BPM | BODY MASS INDEX: 26.22 KG/M2 | SYSTOLIC BLOOD PRESSURE: 167 MMHG | TEMPERATURE: 98.1 F | WEIGHT: 158.8 LBS

## 2023-09-21 DIAGNOSIS — Z48.298 AFTERCARE FOLLOWING ORGAN TRANSPLANT: ICD-10-CM

## 2023-09-21 DIAGNOSIS — E78.2 MIXED HYPERLIPIDEMIA: ICD-10-CM

## 2023-09-21 DIAGNOSIS — Z94.0 KIDNEY REPLACED BY TRANSPLANT: Primary | ICD-10-CM

## 2023-09-21 DIAGNOSIS — D84.9 IMMUNOSUPPRESSION (H): ICD-10-CM

## 2023-09-21 DIAGNOSIS — E83.51 HYPOCALCEMIA: ICD-10-CM

## 2023-09-21 DIAGNOSIS — Z94.0 LIVING-DONOR KIDNEY TRANSPLANT RECIPIENT: ICD-10-CM

## 2023-09-21 PROCEDURE — G0463 HOSPITAL OUTPT CLINIC VISIT: HCPCS | Performed by: INTERNAL MEDICINE

## 2023-09-21 PROCEDURE — 99215 OFFICE O/P EST HI 40 MIN: CPT | Performed by: INTERNAL MEDICINE

## 2023-09-21 ASSESSMENT — PAIN SCALES - GENERAL: PAINLEVEL: NO PAIN (0)
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
## 2023-09-21 NOTE — NURSING NOTE
Chief Complaint   Patient presents with    RECHECK   BP (!) 152/77 (BP Location: Left arm, Patient Position: Sitting, Cuff Size: Adult Regular)   Pulse 70   Temp 98.1  F (36.7  C) (Oral)   Wt 72 kg (158 lb 12.8 oz)   SpO2 97%   BMI 26.22 kg/m  Elena Mathis on 9/21/2023 at 1:51 PM

## 2023-09-21 NOTE — PATIENT INSTRUCTIONS
Calcium carbonate 500mg po bid and recheck in couple week and follow up with your primary doctor, space by two hours from mycophenolate    Labs every 4 months    Blood pressure 3 times weekly and bring your cuff to next primary doctor     Transplant Patient Information  Your Post Transplant Coordinator is: Glory Fuentes  You and your care team can contact your transplant coordinator Monday - Friday, 8am - 5pm at [phone] (Option 2 to reach the coordinator or Option 4 to schedule an appointment).  You can also reach your care team online via [employer].  After hours for urgent matters, please call [facility] at [phone].

## 2023-10-24 PROBLEM — K57.92 DIVERTICULITIS: Status: ACTIVE | Noted: 2023-08-04

## 2023-12-08 ENCOUNTER — TELEPHONE (OUTPATIENT)
Dept: TRANSPLANT | Facility: CLINIC | Age: 73
End: 2023-12-08
Payer: COMMERCIAL

## 2023-12-08 NOTE — LETTER
PHYSICIAN ORDERS      DATE & TIME ISSUED: 2023 5:45 PM  PATIENT NAME: Yfn Cavazos   : 1950     [facility] MR# [if applicable]: [id number]     DIAGNOSIS:  Kidney transplant   ICD-10 CODE: Z94.0      Please repeat the following level in one week  Urine protein creatinine ratio    Any questions please call: [phone] option 5    Please fax each result same day as resulted/available    Critical lab results page [phone]    Please fax results to [phone].      Xu Engle MD

## 2023-12-08 NOTE — TELEPHONE ENCOUNTER
ISSUE: UPCR; 0.91 on 12/6, above baseline of 0.2-0.5    LPN TASK/PLAN:     Please call the patient and ask if he is experiencing any S/S of UTI, ask about recent illness.  What have BP and HR been.    Please have patient repeat labs along with UPCR in one week.  If symptoms of UTI please send order for UA UC.    Please update RNCC with SBP >130l    Thank you.    Glory Fuentes RN   Transplant Coordinator  [phone]

## 2023-12-19 NOTE — TELEPHONE ENCOUNTER
Call placed to patient. Patient denies any recent illness and note that his /80 2 weeks ago. Patient v\u to monitor his BP daily; Report any BP > 130 and repeat his UPCR in one week.

## 2024-01-11 ENCOUNTER — TELEPHONE (OUTPATIENT)
Dept: TRANSPLANT | Facility: CLINIC | Age: 74
End: 2024-01-11
Payer: COMMERCIAL

## 2024-01-11 ENCOUNTER — MYC MEDICAL ADVICE (OUTPATIENT)
Dept: TRANSPLANT | Facility: CLINIC | Age: 74
End: 2024-01-11
Payer: COMMERCIAL

## 2024-01-11 NOTE — TELEPHONE ENCOUNTER
PLAN: follow up on BP and HR after elevated UPC: 0.91 on 12/6. UPC trend down to 0.54 on 12/29    OUTCOME:  my chart sent to patient asking for recent BP and HR readings.    Glory Fuentes RN   Transplant Coordinator  [phone]    ADDENDUM:    Jose Pro MD Sveiven, Sara, RN  Recommend discussing with his PCP starting losartan 25 mg po every day to keep BP<130/80 and control proteinuria.He would need BMP recheck 1 week post starting to monitor Cr and K          Previous Messages       ----- Message -----  From: Glory Fuentes RN  Sent: 1/15/2024  10:18 AM CST  To: Jose Goodrich MD  Subject: HTN                                              Dr Naman Goodrich,    I checking in on Yfn's recent BP as he did have an elevated UPC: 0.9 on 12/6, though it did come down a couple weeks later to ~0.5    He did say that his BP is ~140s/80s HR: 70s    No currently on any BP meds    He does have an appointment next week with PCP, would you like me to instruct him to discuss starting something to keep SBP > 130 or would you like to start something prior to his apt?    Glory Fuentes RN  Transplant Coordinator  [phone]

## 2024-01-17 NOTE — TELEPHONE ENCOUNTER
Jose Pro MD Sveiven, Sara, RN  Recommend discussing with his PCP starting losartan 25 mg po every day to keep BP<130/80 and control proteinuria.He would need BMP recheck 1 week post starting to monitor Cr and K          Previous Messages       ----- Message -----  From: Glory Fuentes RN  Sent: 1/15/2024  10:18 AM CST  To: Jose Goodrich MD  Subject: HTN                                              Dr Naman Goodrich,    I checking in on Yfn's recent BP as he did have an elevated UPC: 0.9 on 12/6, though it did come down a couple weeks later to ~0.5    He did say that his BP is ~140s/80s HR: 70s    No currently on any BP meds    He does have an appointment next week with PCP, would you like me to instruct him to discuss starting something to keep SBP > 130 or would you like to start something prior to his apt?    Glory Fuentes RN  Transplant Coordinator  [phone]     OUTCOME: patient updated via my chart.    Glory Fuentes RN   Transplant Coordinator  [phone]

## 2024-06-09 ENCOUNTER — HEALTH MAINTENANCE LETTER (OUTPATIENT)
Age: 74
End: 2024-06-09

## 2024-07-18 ENCOUNTER — TELEPHONE (OUTPATIENT)
Dept: TRANSPLANT | Facility: CLINIC | Age: 74
End: 2024-07-18
Payer: COMMERCIAL

## 2024-07-18 NOTE — TELEPHONE ENCOUNTER
Left Voicemail (1st Attempt) for the patient to call back and schedule the following:    Appointment type: K/P POST RETURN TXP NEPH  Provider: SARABJIT  Return date: 9/18/24  Specialty phone number: [phone]  Additional appointment(s) needed: N/A  Additonal Notes: Appt should be converted to virtual per Alli Email 7/17

## 2024-08-20 ENCOUNTER — TELEPHONE (OUTPATIENT)
Dept: TRANSPLANT | Facility: CLINIC | Age: 74
End: 2024-08-20
Payer: COMMERCIAL

## 2024-08-20 DIAGNOSIS — Z94.0 KIDNEY REPLACED BY TRANSPLANT: ICD-10-CM

## 2024-08-20 RX ORDER — MYCOPHENOLATE MOFETIL 250 MG/1
CAPSULE ORAL
Qty: 90 CAPSULE | Refills: 11 | Status: SHIPPED | OUTPATIENT
Start: 2024-08-20

## 2024-08-20 NOTE — TELEPHONE ENCOUNTER
Patient Call: Medication Refill  Route to LPN  Instruct the patient to first contact their pharmacy. If they have called their pharmacy and require further assistance, route to LPN.    Pharmacy Name: [facility]  Pharmacy Location: [place], [place]  Name of Medication: mycophenolate (GENERIC EQUIVALENT) 250 MG capsule     When will the patient be out of this medication?: Less than 3 days (Route high priority)

## 2024-09-13 ENCOUNTER — LAB (OUTPATIENT)
Dept: LAB | Facility: CLINIC | Age: 74
End: 2024-09-13
Attending: INTERNAL MEDICINE
Payer: COMMERCIAL

## 2024-09-13 ENCOUNTER — OFFICE VISIT (OUTPATIENT)
Dept: TRANSPLANT | Facility: CLINIC | Age: 74
End: 2024-09-13
Attending: INTERNAL MEDICINE
Payer: COMMERCIAL

## 2024-09-13 VITALS
WEIGHT: 157.8 LBS | SYSTOLIC BLOOD PRESSURE: 152 MMHG | TEMPERATURE: 97.8 F | OXYGEN SATURATION: 95 % | BODY MASS INDEX: 26.06 KG/M2 | DIASTOLIC BLOOD PRESSURE: 75 MMHG | HEART RATE: 67 BPM

## 2024-09-13 DIAGNOSIS — E83.51 HYPOCALCEMIA: ICD-10-CM

## 2024-09-13 DIAGNOSIS — D84.9 IMMUNOSUPPRESSION (H): ICD-10-CM

## 2024-09-13 DIAGNOSIS — Z94.0 STATUS POST KIDNEY TRANSPLANT: Primary | ICD-10-CM

## 2024-09-13 DIAGNOSIS — R80.9 PROTEINURIA, UNSPECIFIED TYPE: ICD-10-CM

## 2024-09-13 DIAGNOSIS — I10 BENIGN ESSENTIAL HYPERTENSION: ICD-10-CM

## 2024-09-13 DIAGNOSIS — Z94.0 KIDNEY REPLACED BY TRANSPLANT: ICD-10-CM

## 2024-09-13 DIAGNOSIS — Z94.0 STATUS POST KIDNEY TRANSPLANT: ICD-10-CM

## 2024-09-13 DIAGNOSIS — Z48.298 AFTERCARE FOLLOWING ORGAN TRANSPLANT: ICD-10-CM

## 2024-09-13 DIAGNOSIS — E78.2 MIXED HYPERLIPIDEMIA: ICD-10-CM

## 2024-09-13 LAB
ALBUMIN MFR UR ELPH: 45.4 MG/DL
ANION GAP SERPL CALCULATED.3IONS-SCNC: 9 MMOL/L (ref 7–15)
BUN SERPL-MCNC: 26.6 MG/DL (ref 8–23)
CALCIUM SERPL-MCNC: 8.6 MG/DL (ref 8.8–10.4)
CHLORIDE SERPL-SCNC: 106 MMOL/L (ref 98–107)
CREAT SERPL-MCNC: 0.95 MG/DL (ref 0.67–1.17)
CREAT UR-MCNC: 58.7 MG/DL
EGFRCR SERPLBLD CKD-EPI 2021: 83 ML/MIN/1.73M2
GLUCOSE SERPL-MCNC: 98 MG/DL (ref 70–99)
HCO3 SERPL-SCNC: 25 MMOL/L (ref 22–29)
MAGNESIUM SERPL-MCNC: 2 MG/DL (ref 1.7–2.3)
POTASSIUM SERPL-SCNC: 4.2 MMOL/L (ref 3.4–5.3)
PROT/CREAT 24H UR: 0.77 MG/MG CR (ref 0–0.2)
PTH-INTACT SERPL-MCNC: 104 PG/ML (ref 15–65)
SODIUM SERPL-SCNC: 140 MMOL/L (ref 135–145)
VIT D+METAB SERPL-MCNC: 31 NG/ML (ref 20–50)

## 2024-09-13 PROCEDURE — 80048 BASIC METABOLIC PNL TOTAL CA: CPT | Performed by: INTERNAL MEDICINE

## 2024-09-13 PROCEDURE — 83735 ASSAY OF MAGNESIUM: CPT | Performed by: INTERNAL MEDICINE

## 2024-09-13 PROCEDURE — 84156 ASSAY OF PROTEIN URINE: CPT | Performed by: INTERNAL MEDICINE

## 2024-09-13 PROCEDURE — 83970 ASSAY OF PARATHORMONE: CPT | Performed by: PATHOLOGY

## 2024-09-13 PROCEDURE — 82306 VITAMIN D 25 HYDROXY: CPT | Performed by: INTERNAL MEDICINE

## 2024-09-13 PROCEDURE — 99000 SPECIMEN HANDLING OFFICE-LAB: CPT | Performed by: PATHOLOGY

## 2024-09-13 PROCEDURE — 36415 COLL VENOUS BLD VENIPUNCTURE: CPT | Performed by: PATHOLOGY

## 2024-09-13 PROCEDURE — 99215 OFFICE O/P EST HI 40 MIN: CPT | Performed by: INTERNAL MEDICINE

## 2024-09-13 PROCEDURE — G0463 HOSPITAL OUTPT CLINIC VISIT: HCPCS | Performed by: INTERNAL MEDICINE

## 2024-09-13 RX ORDER — AMLODIPINE BESYLATE 2.5 MG/1
1 TABLET ORAL
COMMUNITY
Start: 2023-09-22 | End: 2024-09-13

## 2024-09-13 RX ORDER — LOSARTAN POTASSIUM 25 MG/1
25 TABLET ORAL DAILY
COMMUNITY

## 2024-09-13 RX ORDER — AMLODIPINE BESYLATE 5 MG/1
5 TABLET ORAL DAILY
COMMUNITY

## 2024-09-13 ASSESSMENT — PAIN SCALES - GENERAL: PAINLEVEL: NO PAIN (0)
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
## 2024-09-13 NOTE — NURSING NOTE
Chief Complaint   Patient presents with    RECHECK     TXP follow up.      Vitals:    09/13/24 0941 09/13/24 0943 09/13/24 0944 09/13/24 0945   BP: (!) 157/75 (!) 149/76 (!) 150/74 (!) 152/75   BP Location: Left arm Left arm Left arm    Patient Position: Sitting Sitting Sitting    Cuff Size: Adult Regular Adult Regular Adult Regular    Pulse: 67      Temp: 97.8  F (36.6  C)      TempSrc: Oral      SpO2: 95%      Weight: 71.6 kg (157 lb 12.8 oz)          BP Readings from Last 3 Encounters:   09/13/24 (!) 152/75   09/21/23 (!) 167/87   06/21/22 (!) 143/81       BP (!) 152/75   Pulse 67   Temp 97.8  F (36.6  C) (Oral)   Wt 71.6 kg (157 lb 12.8 oz)   SpO2 95%   BMI 26.06 kg/m       Sunshine Heymalexander
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
## 2024-09-17 PROBLEM — E83.51 HYPOCALCEMIA: Status: ACTIVE | Noted: 2024-09-17

## 2024-09-17 PROBLEM — I10 BENIGN ESSENTIAL HYPERTENSION: Status: ACTIVE | Noted: 2024-09-17

## 2024-09-17 PROBLEM — R80.9 PROTEINURIA, UNSPECIFIED TYPE: Status: ACTIVE | Noted: 2024-09-17

## 2025-01-27 ENCOUNTER — TELEPHONE (OUTPATIENT)
Dept: TRANSPLANT | Facility: CLINIC | Age: 75
End: 2025-01-27
Payer: COMMERCIAL

## 2025-01-27 DIAGNOSIS — Z48.298 AFTERCARE FOLLOWING ORGAN TRANSPLANT: Primary | ICD-10-CM

## 2025-01-27 DIAGNOSIS — Z98.890 OTHER SPECIFIED POSTPROCEDURAL STATES: ICD-10-CM

## 2025-01-27 DIAGNOSIS — Z20.828 CONTACT WITH AND (SUSPECTED) EXPOSURE TO OTHER VIRAL COMMUNICABLE DISEASES: ICD-10-CM

## 2025-01-27 DIAGNOSIS — Z79.899 ENCOUNTER FOR LONG-TERM CURRENT USE OF MEDICATION: ICD-10-CM

## 2025-01-27 DIAGNOSIS — Z94.0 KIDNEY REPLACED BY TRANSPLANT: ICD-10-CM

## 2025-01-27 NOTE — TELEPHONE ENCOUNTER
[facility] Call Center    Phone Message    May a detailed message be left on voicemail: yes     Reason for Call: Other: Pt calling in and would like lab orders faxed to # [phone] [facility]. Yfn went in this morning for a lab and they would not draw the labs. Yfn is frustrated and is asking for call back once this has been completed. Could you please send those over asap to them and call Yfn back?  Thanks!     Action Taken: Other: [facility]SC    Travel Screening: Not Applicable

## 2025-01-27 NOTE — LETTER
OUTPATIENT LABORATORY TEST ORDER     Patient Name: Yfn Cavazos   YOB: 1950     [facility] MR# [if applicable]: [id number]   Date & Time: January 27, 2025  2:45 PM  Expiration Date: 1 year after date issued      Diagnoses: Kidney Transplant (ICD-10 Z94.0)   Long term use of medications (ICD-10 Z79.899)    Other specified postprocedural states (Z98.890)     We ask your assistance in obtaining the following laboratory tests, which are part of our routine surveillance program for Solid Organ Transplant patients.     Please fax each result to [phone], same day as resulted/available    Critical lab results page [phone]      Every 3 months  CBC with platelets  Basic Metabolic Panel (Sodium, Potassium, Chloride, Creatinine, CO2, Urea Nitrogen, glucose, Calcium)    Yearly  Urine for protein/creatinine       If you have any questions, please call [facility] [phone] or [phone], Fax [phone].      Yanet Peterson M.D.   of Medicine  Nephrology and Hypertension  Department of [facility]  [facility]    Call  with any questions. Thanks!

## 2025-06-15 ENCOUNTER — HEALTH MAINTENANCE LETTER (OUTPATIENT)
Age: 75
End: 2025-06-15

## 2025-08-28 DIAGNOSIS — Z94.0 KIDNEY REPLACED BY TRANSPLANT: Primary | ICD-10-CM

## 2025-08-28 RX ORDER — MYCOPHENOLATE MOFETIL 250 MG/1
CAPSULE ORAL
Qty: 90 CAPSULE | Refills: 0 | Status: SHIPPED | OUTPATIENT
Start: 2025-08-28

## 2025-09-04 ENCOUNTER — OFFICE VISIT (OUTPATIENT)
Dept: TRANSPLANT | Facility: CLINIC | Age: 75
End: 2025-09-04
Attending: INTERNAL MEDICINE
Payer: COMMERCIAL

## 2025-09-04 VITALS
HEIGHT: 65 IN | WEIGHT: 154.1 LBS | HEART RATE: 70 BPM | DIASTOLIC BLOOD PRESSURE: 79 MMHG | OXYGEN SATURATION: 97 % | BODY MASS INDEX: 25.67 KG/M2 | SYSTOLIC BLOOD PRESSURE: 155 MMHG

## 2025-09-04 DIAGNOSIS — R94.8 ABNORMAL RESULTS OF FUNCTION STUDIES OF OTHER ORGANS AND SYSTEMS: ICD-10-CM

## 2025-09-04 DIAGNOSIS — I10 BENIGN ESSENTIAL HYPERTENSION: ICD-10-CM

## 2025-09-04 DIAGNOSIS — Z94.0 KIDNEY REPLACED BY TRANSPLANT: Primary | ICD-10-CM

## 2025-09-04 DIAGNOSIS — K57.92 DIVERTICULITIS: ICD-10-CM

## 2025-09-04 DIAGNOSIS — R80.9 PROTEINURIA, UNSPECIFIED TYPE: ICD-10-CM

## 2025-09-04 DIAGNOSIS — E83.51 HYPOCALCEMIA: ICD-10-CM

## 2025-09-04 DIAGNOSIS — Z48.298 AFTERCARE FOLLOWING ORGAN TRANSPLANT: ICD-10-CM

## 2025-09-04 DIAGNOSIS — R79.9 ABNORMAL FINDING OF BLOOD CHEMISTRY, UNSPECIFIED: ICD-10-CM

## 2025-09-04 DIAGNOSIS — D84.9 IMMUNOSUPPRESSION: ICD-10-CM

## 2025-09-04 DIAGNOSIS — E78.2 MIXED HYPERLIPIDEMIA: ICD-10-CM

## 2025-09-04 LAB
ALBUMIN MFR UR ELPH: 134 MG/DL
ALBUMIN UR-MCNC: 70 MG/DL
APPEARANCE UR: CLEAR
BILIRUB UR QL STRIP: NEGATIVE
COLOR UR AUTO: ABNORMAL
CREAT UR-MCNC: 61.4 MG/DL
GLUCOSE UR STRIP-MCNC: NEGATIVE MG/DL
HGB UR QL STRIP: NEGATIVE
KETONES UR STRIP-MCNC: NEGATIVE MG/DL
LEUKOCYTE ESTERASE UR QL STRIP: NEGATIVE
MUCOUS THREADS #/AREA URNS LPF: PRESENT /LPF
NITRATE UR QL: NEGATIVE
PH UR STRIP: 5.5 [PH] (ref 5–7)
PROT/CREAT 24H UR: 2.18 MG/MG CR (ref 0–0.2)
RBC URINE: <1 /HPF
SP GR UR STRIP: 1.01 (ref 1–1.03)
UROBILINOGEN UR STRIP-MCNC: NORMAL MG/DL
WBC URINE: <1 /HPF

## 2025-09-04 PROCEDURE — G0463 HOSPITAL OUTPT CLINIC VISIT: HCPCS | Performed by: INTERNAL MEDICINE

## 2025-09-04 PROCEDURE — 81003 URINALYSIS AUTO W/O SCOPE: CPT | Performed by: INTERNAL MEDICINE

## 2025-09-04 PROCEDURE — 84156 ASSAY OF PROTEIN URINE: CPT | Performed by: INTERNAL MEDICINE

## 2025-09-04 RX ORDER — LOSARTAN POTASSIUM 25 MG/1
25 TABLET ORAL 2 TIMES DAILY
Qty: 180 TABLET | Refills: 3 | Status: SHIPPED | OUTPATIENT
Start: 2025-09-04 | End: 2026-08-30

## 2025-09-04 RX ORDER — CALCIUM CARBONATE/VITAMIN D3 600 MG-10
3 TABLET ORAL DAILY
COMMUNITY

## 2025-09-04 ASSESSMENT — PAIN SCALES - GENERAL: PAINLEVEL_OUTOF10: NO PAIN (0)
